# Patient Record
Sex: FEMALE | Race: WHITE | NOT HISPANIC OR LATINO | Employment: OTHER | URBAN - NONMETROPOLITAN AREA
[De-identification: names, ages, dates, MRNs, and addresses within clinical notes are randomized per-mention and may not be internally consistent; named-entity substitution may affect disease eponyms.]

---

## 2023-04-17 NOTE — PROGRESS NOTES
"Subjective     Ciera Deluna is a 73 y.o. female who presents for Follow-up (Follow up meds/results).  Patient is present today with her .      HPI  The patient is a 73 year-old female presenting to the clinic for follow up complaining of cyst on the left wrist causing pressure and pain.  On examination consistent with a ganglion cyst.  Recommend and referred to the orthopedic surgeon/hand surgeon.  Educated osteoporosis and muscle center exercise.  Complaining of congestion and sneezing and clearing throat.  Sinus pressure facial pain headache and low-grade fever.  Home COVID test was negative      Review of Systems  Review of systems    General.  Denies fever.  Denies chills.    HEENT.  Dictated as above  Respiratory.  Denies cough.  Denies shortness of breath.    Cardiovascular.  Denies chest pain.  Denies heart palpitations.  Denies shortness of breath.    Gastrointestinal.  Denies nausea vomiting diarrhea.  Denies abdominal pain.    Genitourinary denies burning urination.  Denies frequent urination.  Denies flank pain.  Denies blood in the urine.  Denies abnormal vaginal discharge.    Neurology.  Denies tingling numbness but denies weakness.  Denies headache.  Denies blurred vision.    Musculoskeletal.  Dictated as above.      Endocrinology.  Denies cold intolerance.  Denies hot intolerance.    Psychiatric.  Denies depression.  Denies anxiety.  Denies suicidal.  Denies homicidal.  Skin.  Dictated as above    Objective   BP 96/61 (BP Location: Left arm, Patient Position: Sitting, BP Cuff Size: Adult)   Pulse 62   Ht 1.575 m (5' 2\")   Wt 46.9 kg (103 lb 6.4 oz)   SpO2 96%   BMI 18.91 kg/m²        Physical Exam  General.  Not in distress.  HEENT normocephalic anicteric sclerae.  Nasal mucosa was moist and congested and tenderness noted on maxillary sinus area.    Neck soft supple no thyromegaly.  No carotid bruit.  Lungs are clear.  Heart regular.  Abdomen soft nontender nondistended bowel " sounds are positive.  Extremities no clubbing cyanosis or edema.  Psychiatric.  Has good eye contact.  No crying spells noted.  Speech was normal.  Denies depression.  Denies suicidal.  Denies homicidal.  Skin exam.  Ganglion cyst noted on the left    Assessment/Plan     1.  Ganglion cyst left hand.  Dictated as above  2.  Osteoporosis.  Educated on osteoporosis and muscles and exercise.  We will continue Prolia and calcium and vitamin D  3.  Allergic rhinitis but educated on allergic rhinitis.  Advised to increase oral fluid intake  4.  Acute recurrent maxillary sinusitis.  Educated on sinusitis but advised to increase oral fluid intake.  Advised to call 911 or to go to the emergency room as soon as possible if develops high fever or shortness of breath.            Problem List Items Addressed This Visit          Musculoskeletal    Age-related osteoporosis without current pathological fracture    Relevant Medications    denosumab (Prolia) 60 mg/mL syringe       Other    Allergic rhinitis due to allergen    Relevant Medications    methylPREDNISolone (Medrol Dospak) 4 mg tablets     Other Visit Diagnoses       Ganglion cyst    -  Primary    Relevant Orders    Referral to Orthopaedic Surgery    Acute recurrent maxillary sinusitis        Relevant Medications    azithromycin (Zithromax) 250 mg tablet    methylPREDNISolone (Medrol Dospak) 4 mg tablets            Scribe Attestation  By signing my name below, IMyrna Scribraul   attest that this documentation has been prepared under the direction and in the presence of Jose Angel Pérez MD.

## 2023-04-18 ENCOUNTER — OFFICE VISIT (OUTPATIENT)
Dept: PRIMARY CARE | Facility: CLINIC | Age: 73
End: 2023-04-18
Payer: MEDICARE

## 2023-04-18 VITALS
SYSTOLIC BLOOD PRESSURE: 96 MMHG | WEIGHT: 103.4 LBS | DIASTOLIC BLOOD PRESSURE: 61 MMHG | HEIGHT: 62 IN | HEART RATE: 62 BPM | OXYGEN SATURATION: 96 % | BODY MASS INDEX: 19.03 KG/M2

## 2023-04-18 DIAGNOSIS — J30.89 NON-SEASONAL ALLERGIC RHINITIS DUE TO OTHER ALLERGIC TRIGGER: ICD-10-CM

## 2023-04-18 DIAGNOSIS — J01.01 ACUTE RECURRENT MAXILLARY SINUSITIS: ICD-10-CM

## 2023-04-18 DIAGNOSIS — M81.0 AGE-RELATED OSTEOPOROSIS WITHOUT CURRENT PATHOLOGICAL FRACTURE: ICD-10-CM

## 2023-04-18 DIAGNOSIS — M67.40 GANGLION CYST: Primary | ICD-10-CM

## 2023-04-18 PROBLEM — J30.9 ALLERGIC RHINITIS DUE TO ALLERGEN: Status: ACTIVE | Noted: 2023-04-18

## 2023-04-18 PROBLEM — L98.9 CHANGING SKIN LESION: Status: ACTIVE | Noted: 2023-04-18

## 2023-04-18 PROBLEM — J30.9 AR (ALLERGIC RHINITIS): Status: ACTIVE | Noted: 2023-04-18

## 2023-04-18 PROBLEM — K59.09 CHRONIC CONSTIPATION: Status: ACTIVE | Noted: 2023-04-18

## 2023-04-18 PROBLEM — F41.9 ANXIETY: Status: ACTIVE | Noted: 2023-04-18

## 2023-04-18 PROBLEM — J34.2 DEVIATED NASAL SEPTUM: Status: ACTIVE | Noted: 2023-04-18

## 2023-04-18 PROBLEM — M70.70 BURSITIS OF HIP: Status: ACTIVE | Noted: 2023-04-18

## 2023-04-18 PROBLEM — K21.9 GERD (GASTROESOPHAGEAL REFLUX DISEASE): Status: ACTIVE | Noted: 2023-04-18

## 2023-04-18 PROBLEM — R53.82 CHRONIC FATIGUE: Status: ACTIVE | Noted: 2023-04-18

## 2023-04-18 PROBLEM — R00.2 HEART PALPITATIONS: Status: ACTIVE | Noted: 2023-04-18

## 2023-04-18 PROBLEM — E78.5 DYSLIPIDEMIA: Status: ACTIVE | Noted: 2023-04-18

## 2023-04-18 PROCEDURE — 1159F MED LIST DOCD IN RCRD: CPT | Performed by: FAMILY MEDICINE

## 2023-04-18 PROCEDURE — 1036F TOBACCO NON-USER: CPT | Performed by: FAMILY MEDICINE

## 2023-04-18 PROCEDURE — 1160F RVW MEDS BY RX/DR IN RCRD: CPT | Performed by: FAMILY MEDICINE

## 2023-04-18 PROCEDURE — 99213 OFFICE O/P EST LOW 20 MIN: CPT | Performed by: FAMILY MEDICINE

## 2023-04-18 RX ORDER — FAMOTIDINE 20 MG/1
20 TABLET, FILM COATED ORAL 2 TIMES DAILY
COMMUNITY
Start: 2022-04-23

## 2023-04-18 RX ORDER — METHYLPREDNISOLONE 4 MG/1
TABLET ORAL
Qty: 21 TABLET | Refills: 0 | Status: SHIPPED | OUTPATIENT
Start: 2023-04-18 | End: 2023-04-25

## 2023-04-18 RX ORDER — FLUTICASONE PROPIONATE 50 MCG
2 SPRAY, SUSPENSION (ML) NASAL 2 TIMES DAILY
COMMUNITY
Start: 2022-04-15 | End: 2023-12-12 | Stop reason: WASHOUT

## 2023-04-18 RX ORDER — DENOSUMAB 60 MG/ML
60 INJECTION SUBCUTANEOUS ONCE
COMMUNITY
Start: 2021-09-08 | End: 2023-09-11 | Stop reason: ALTCHOICE

## 2023-04-18 RX ORDER — AZITHROMYCIN 250 MG/1
TABLET, FILM COATED ORAL
Qty: 6 TABLET | Refills: 0 | Status: SHIPPED | OUTPATIENT
Start: 2023-04-18 | End: 2023-04-23

## 2023-04-18 ASSESSMENT — PAIN SCALES - GENERAL: PAINLEVEL: 0-NO PAIN

## 2023-04-18 ASSESSMENT — ENCOUNTER SYMPTOMS
DEPRESSION: 0
OCCASIONAL FEELINGS OF UNSTEADINESS: 0
LOSS OF SENSATION IN FEET: 0

## 2023-09-11 PROBLEM — J30.9 AR (ALLERGIC RHINITIS): Status: RESOLVED | Noted: 2023-04-18 | Resolved: 2023-09-11

## 2023-09-11 NOTE — PROGRESS NOTES
Subjective     HPI   Ciera Deluna is a 73 y.o. year old female patient with presenting to clinic with concern for   Chief Complaint   Patient presents with    New Patient Visit     Establish care. Schedule next prolia shot for osteoporosis.     Herpes Zoster     Get reoccurring shingles.        Ciera presents to clinic to establish as a new patient. She was previously seen by Dr. Pérez.     Due for prolia    Anxiety has been more severe despite Remeron. Looking for something else to help her. She doesn't like having to take the xanax, but it's the only thing that works.     Patient Active Problem List   Diagnosis    Age-related osteoporosis without current pathological fracture    Allergic rhinitis due to allergen    Anxiety    Bursitis of hip    Changing skin lesion    Chronic constipation    Chronic fatigue    Deviated nasal septum    Dyslipidemia    GERD (gastroesophageal reflux disease)    Heart palpitations    Squamous cell carcinoma in situ       Past Medical History:   Diagnosis Date    Acute maxillary sinusitis, unspecified 2022    Acute non-recurrent maxillary sinusitis    Otitis media, unspecified, bilateral 2022    Acute bilateral otitis media    Personal history of other diseases of the respiratory system 2022    History of acute bronchitis      Past Surgical History:   Procedure Laterality Date    OTHER SURGICAL HISTORY  2021    Cataract surgery    OTHER SURGICAL HISTORY  2021    Tonsillectomy    OTHER SURGICAL HISTORY  2021    Eye surgery    OTHER SURGICAL HISTORY  2021     section      Family History   Problem Relation Name Age of Onset    Pancreatic cancer Mother      Other (Bladder Cancer) Father        Social History     Tobacco Use    Smoking status: Never    Smokeless tobacco: Never   Substance Use Topics    Alcohol use: Yes     Comment: social        Current Outpatient Medications:     multivitamin with minerals  "(multivitamin-iron-folic acid) tablet, Take 1 tablet by mouth once daily., Disp: , Rfl:     ALPRAZolam (Xanax) 0.25 mg tablet, Take 1 tablet (0.25 mg) by mouth as needed at bedtime for anxiety., Disp: 15 tablet, Rfl: 3    famotidine (Pepcid) 20 mg tablet, Take 1 tablet (20 mg) by mouth 2 times a day., Disp: , Rfl:     fluticasone (Flonase) 50 mcg/actuation nasal spray, Administer 2 sprays into affected nostril(s) twice a day., Disp: , Rfl:     mirtazapine (Remeron) 7.5 mg tablet, Take 1 tablet (7.5 mg) by mouth once daily at bedtime., Disp: , Rfl:     sertraline (Zoloft) 25 mg tablet, Take 1 tablet (25 mg) by mouth once daily., Disp: 90 tablet, Rfl: 0    valACYclovir (Valtrex) 500 mg tablet, Take 1 tablet twice a day for 7 days for outbreak, Disp: 21 tablet, Rfl: 3     Review of Systems  Constitutional: Denies fever  HEENT: Denies ST, earache  CVS: Denies Chest pain  Pulmonary: Denies wheezing, SOB  GI: Denies N/V  : Denies dysuria  Musculoskeletal:  Denies myalgia  Neuro: Denies focal weakness or numbness.  Skin: Denies Rashes.  *Review of Systems is negative unless otherwise mentioned in HPI or ROS above.    Objective   /62   Pulse 61   Temp 37 °C (98.6 °F)   Ht 1.575 m (5' 2\")   Wt 46.5 kg (102 lb 9.6 oz)   SpO2 97%   BMI 18.77 kg/m²  reviewed Body mass index is 18.77 kg/m².     Physical Exam  Constitutional: NAD.  Resting comfortably.  Head: Atraumatic, normocephalic.  ENT: Moist oral mucosa. Nasal mucosa mildly edematous and nonerythematous.   Cardiac: Regular rate & rhythm.   Pulmonary: Lungs clear bilat  GI: Soft, Nontender, nondistended.   Musculoskeletal: No peripheral edema.   Skin: No evidence of trauma. No rashes  Psych: Intact judgement and insight.    .Assessment/Plan   Problem List Items Addressed This Visit       Anxiety - Primary    Relevant Medications    ALPRAZolam (Xanax) 0.25 mg tablet    sertraline (Zoloft) 25 mg tablet     Other Visit Diagnoses       Herpes zoster without " complication        Relevant Medications    valACYclovir (Valtrex) 500 mg tablet    Age related osteoporosis, unspecified pathological fracture presence        Relevant Orders    Basic Metabolic Panel    Calcium    Magnesium    Phosphorus    XR mandible < 4 views          Prolia due 10/18/23

## 2023-09-12 ENCOUNTER — OFFICE VISIT (OUTPATIENT)
Dept: PRIMARY CARE | Facility: CLINIC | Age: 73
End: 2023-09-12
Payer: MEDICARE

## 2023-09-12 VITALS
WEIGHT: 102.6 LBS | OXYGEN SATURATION: 97 % | HEART RATE: 61 BPM | BODY MASS INDEX: 18.88 KG/M2 | SYSTOLIC BLOOD PRESSURE: 104 MMHG | HEIGHT: 62 IN | DIASTOLIC BLOOD PRESSURE: 62 MMHG | TEMPERATURE: 98.6 F

## 2023-09-12 DIAGNOSIS — F41.9 ANXIETY: Primary | ICD-10-CM

## 2023-09-12 DIAGNOSIS — M81.0 AGE RELATED OSTEOPOROSIS, UNSPECIFIED PATHOLOGICAL FRACTURE PRESENCE: ICD-10-CM

## 2023-09-12 DIAGNOSIS — B02.9 HERPES ZOSTER WITHOUT COMPLICATION: ICD-10-CM

## 2023-09-12 PROBLEM — D09.9 SQUAMOUS CELL CARCINOMA IN SITU: Status: ACTIVE | Noted: 2023-09-12

## 2023-09-12 PROCEDURE — 1126F AMNT PAIN NOTED NONE PRSNT: CPT | Performed by: PHYSICIAN ASSISTANT

## 2023-09-12 PROCEDURE — 1159F MED LIST DOCD IN RCRD: CPT | Performed by: PHYSICIAN ASSISTANT

## 2023-09-12 PROCEDURE — 99214 OFFICE O/P EST MOD 30 MIN: CPT | Performed by: PHYSICIAN ASSISTANT

## 2023-09-12 PROCEDURE — 1036F TOBACCO NON-USER: CPT | Performed by: PHYSICIAN ASSISTANT

## 2023-09-12 PROCEDURE — 1160F RVW MEDS BY RX/DR IN RCRD: CPT | Performed by: PHYSICIAN ASSISTANT

## 2023-09-12 RX ORDER — MIRTAZAPINE 7.5 MG/1
7.5 TABLET, FILM COATED ORAL NIGHTLY
COMMUNITY
End: 2023-12-12 | Stop reason: SDUPTHER

## 2023-09-12 RX ORDER — SERTRALINE HYDROCHLORIDE 25 MG/1
25 TABLET, FILM COATED ORAL DAILY
Qty: 90 TABLET | Refills: 0 | Status: SHIPPED | OUTPATIENT
Start: 2023-09-12 | End: 2023-12-12 | Stop reason: SDUPTHER

## 2023-09-12 RX ORDER — ALPRAZOLAM 0.25 MG/1
0.25 TABLET ORAL NIGHTLY PRN
Qty: 15 TABLET | Refills: 3 | Status: SHIPPED | OUTPATIENT
Start: 2023-09-12 | End: 2023-12-12 | Stop reason: SDUPTHER

## 2023-09-12 RX ORDER — ALPRAZOLAM 0.25 MG/1
TABLET ORAL
COMMUNITY
Start: 2021-02-11 | End: 2023-09-12 | Stop reason: SDUPTHER

## 2023-09-12 RX ORDER — HYDRALAZINE HYDROCHLORIDE 10 MG/1
10 TABLET, FILM COATED ORAL 3 TIMES DAILY PRN
COMMUNITY
Start: 2022-03-09 | End: 2023-09-12 | Stop reason: WASHOUT

## 2023-09-12 RX ORDER — CITALOPRAM 10 MG/1
TABLET ORAL
COMMUNITY
Start: 2021-07-14 | End: 2023-09-12 | Stop reason: WASHOUT

## 2023-09-12 RX ORDER — VALACYCLOVIR HYDROCHLORIDE 500 MG/1
TABLET, FILM COATED ORAL
Qty: 21 TABLET | Refills: 3 | Status: SHIPPED | OUTPATIENT
Start: 2023-09-12 | End: 2023-12-12 | Stop reason: WASHOUT

## 2023-09-12 ASSESSMENT — ANXIETY QUESTIONNAIRES
6. BECOMING EASILY ANNOYED OR IRRITABLE: NEARLY EVERY DAY
1. FEELING NERVOUS, ANXIOUS, OR ON EDGE: MORE THAN HALF THE DAYS
GAD7 TOTAL SCORE: 15
IF YOU CHECKED OFF ANY PROBLEMS ON THIS QUESTIONNAIRE, HOW DIFFICULT HAVE THESE PROBLEMS MADE IT FOR YOU TO DO YOUR WORK, TAKE CARE OF THINGS AT HOME, OR GET ALONG WITH OTHER PEOPLE: SOMEWHAT DIFFICULT
4. TROUBLE RELAXING: MORE THAN HALF THE DAYS
2. NOT BEING ABLE TO STOP OR CONTROL WORRYING: MORE THAN HALF THE DAYS
7. FEELING AFRAID AS IF SOMETHING AWFUL MIGHT HAPPEN: MORE THAN HALF THE DAYS
3. WORRYING TOO MUCH ABOUT DIFFERENT THINGS: MORE THAN HALF THE DAYS
5. BEING SO RESTLESS THAT IT IS HARD TO SIT STILL: MORE THAN HALF THE DAYS

## 2023-09-12 ASSESSMENT — PATIENT HEALTH QUESTIONNAIRE - PHQ9
2. FEELING DOWN, DEPRESSED OR HOPELESS: NOT AT ALL
5. POOR APPETITE OR OVEREATING: SEVERAL DAYS
10. IF YOU CHECKED OFF ANY PROBLEMS, HOW DIFFICULT HAVE THESE PROBLEMS MADE IT FOR YOU TO DO YOUR WORK, TAKE CARE OF THINGS AT HOME, OR GET ALONG WITH OTHER PEOPLE: SOMEWHAT DIFFICULT
3. TROUBLE FALLING OR STAYING ASLEEP OR SLEEPING TOO MUCH: MORE THAN HALF THE DAYS
7. TROUBLE CONCENTRATING ON THINGS, SUCH AS READING THE NEWSPAPER OR WATCHING TELEVISION: SEVERAL DAYS
4. FEELING TIRED OR HAVING LITTLE ENERGY: NOT AT ALL
6. FEELING BAD ABOUT YOURSELF - OR THAT YOU ARE A FAILURE OR HAVE LET YOURSELF OR YOUR FAMILY DOWN: NOT AT ALL
9. THOUGHTS THAT YOU WOULD BE BETTER OFF DEAD, OR OF HURTING YOURSELF: NOT AT ALL
8. MOVING OR SPEAKING SO SLOWLY THAT OTHER PEOPLE COULD HAVE NOTICED. OR THE OPPOSITE, BEING SO FIGETY OR RESTLESS THAT YOU HAVE BEEN MOVING AROUND A LOT MORE THAN USUAL: SEVERAL DAYS

## 2023-10-04 ENCOUNTER — TELEPHONE (OUTPATIENT)
Dept: PRIMARY CARE | Facility: CLINIC | Age: 73
End: 2023-10-04
Payer: MEDICARE

## 2023-10-04 NOTE — TELEPHONE ENCOUNTER
Patient called saying that since on the Zoloft, she feels great but now she is having trouble sleeping.     She said she was given Zoloft because she gets reoccurring shingles and it can become really itchy. She was told to take that when that happens cause it can make her really anxious. What are your recommendations for helping her sleep?

## 2023-10-11 ENCOUNTER — APPOINTMENT (OUTPATIENT)
Dept: PRIMARY CARE | Facility: CLINIC | Age: 73
End: 2023-10-11
Payer: MEDICARE

## 2023-10-30 DIAGNOSIS — M81.0 AGE RELATED OSTEOPOROSIS, UNSPECIFIED PATHOLOGICAL FRACTURE PRESENCE: Primary | ICD-10-CM

## 2023-10-31 ENCOUNTER — PHARMACY VISIT (OUTPATIENT)
Dept: PHARMACY | Facility: CLINIC | Age: 73
End: 2023-10-31
Payer: COMMERCIAL

## 2023-10-31 DIAGNOSIS — M81.0 AGE-RELATED OSTEOPOROSIS WITHOUT CURRENT PATHOLOGICAL FRACTURE: Primary | ICD-10-CM

## 2023-10-31 DIAGNOSIS — M81.0 AGE RELATED OSTEOPOROSIS, UNSPECIFIED PATHOLOGICAL FRACTURE PRESENCE: ICD-10-CM

## 2023-11-02 DIAGNOSIS — M81.0 AGE-RELATED OSTEOPOROSIS WITHOUT CURRENT PATHOLOGICAL FRACTURE: Primary | ICD-10-CM

## 2023-11-02 RX ORDER — FAMOTIDINE 10 MG/ML
20 INJECTION INTRAVENOUS ONCE AS NEEDED
Status: CANCELLED | OUTPATIENT
Start: 2023-11-07

## 2023-11-02 RX ORDER — EPINEPHRINE 0.3 MG/.3ML
0.3 INJECTION SUBCUTANEOUS EVERY 5 MIN PRN
Status: CANCELLED | OUTPATIENT
Start: 2023-11-07

## 2023-11-02 RX ORDER — DIPHENHYDRAMINE HYDROCHLORIDE 50 MG/ML
50 INJECTION INTRAMUSCULAR; INTRAVENOUS AS NEEDED
Status: CANCELLED | OUTPATIENT
Start: 2023-11-07

## 2023-11-02 RX ORDER — ALBUTEROL SULFATE 0.83 MG/ML
3 SOLUTION RESPIRATORY (INHALATION) AS NEEDED
Status: CANCELLED | OUTPATIENT
Start: 2023-11-07

## 2023-11-03 ENCOUNTER — LAB (OUTPATIENT)
Dept: LAB | Facility: LAB | Age: 73
End: 2023-11-03
Payer: MEDICARE

## 2023-11-03 DIAGNOSIS — M81.0 AGE RELATED OSTEOPOROSIS, UNSPECIFIED PATHOLOGICAL FRACTURE PRESENCE: ICD-10-CM

## 2023-11-03 PROBLEM — M85.80 OTHER SPECIFIED DISORDERS OF BONE DENSITY AND STRUCTURE, UNSPECIFIED SITE: Status: ACTIVE | Noted: 2022-09-15

## 2023-11-03 PROBLEM — L98.9 DISEASE OF SKIN AND SUBCUTANEOUS TISSUE: Status: ACTIVE | Noted: 2022-06-15

## 2023-11-03 PROBLEM — K12.0 ORAL APHTHAE: Status: ACTIVE | Noted: 2023-11-03

## 2023-11-03 PROBLEM — B02.9 HERPES ZOSTER WITHOUT COMPLICATION: Status: ACTIVE | Noted: 2023-11-03

## 2023-11-03 PROBLEM — M19.032 PRIMARY ARTHROSIS OF LEFT DISTAL RADIOULNAR JOINT: Status: ACTIVE | Noted: 2023-11-03

## 2023-11-03 PROBLEM — R63.6 UNDERWEIGHT: Status: ACTIVE | Noted: 2023-11-03

## 2023-11-03 PROBLEM — K13.70 ORAL MUCOSAL LESION: Status: ACTIVE | Noted: 2023-11-03

## 2023-11-03 PROBLEM — F51.04 PSYCHOPHYSIOLOGICAL INSOMNIA: Status: ACTIVE | Noted: 2023-11-03

## 2023-11-03 PROBLEM — M85.89 OSTEOPENIA OF MULTIPLE SITES: Status: ACTIVE | Noted: 2023-11-03

## 2023-11-03 PROBLEM — J34.3 HYPERTROPHY OF NASAL TURBINATES: Status: ACTIVE | Noted: 2022-09-08

## 2023-11-03 PROBLEM — D72.819 LEUCOPENIA: Status: ACTIVE | Noted: 2023-11-03

## 2023-11-03 PROBLEM — R82.90 ABNORMAL RESULT ON SCREENING URINE TEST: Status: ACTIVE | Noted: 2023-11-03

## 2023-11-03 PROBLEM — M19.039 LOCALIZED PRIMARY OSTEOARTHRITIS OF WRIST: Status: ACTIVE | Noted: 2023-05-08

## 2023-11-03 PROBLEM — D04.39 CARCINOMA IN SITU OF SKIN OF OTHER PARTS OF FACE: Status: ACTIVE | Noted: 2022-08-23

## 2023-11-03 PROBLEM — B37.0 CANDIDIASIS OF MOUTH: Status: ACTIVE | Noted: 2022-11-18

## 2023-11-03 PROBLEM — R13.10 DYSPHAGIA: Status: ACTIVE | Noted: 2022-11-18

## 2023-11-03 PROBLEM — E55.9 VITAMIN D DEFICIENCY: Status: ACTIVE | Noted: 2023-11-03

## 2023-11-03 PROBLEM — R00.0 TACHYCARDIA: Status: ACTIVE | Noted: 2022-09-15

## 2023-11-03 PROBLEM — R22.32 LOCALIZED SWELLING, MASS AND LUMP, LEFT UPPER LIMB: Status: ACTIVE | Noted: 2023-05-08

## 2023-11-03 LAB
ANION GAP SERPL CALC-SCNC: 9 MMOL/L (ref 10–20)
BUN SERPL-MCNC: 17 MG/DL (ref 6–23)
CALCIUM SERPL-MCNC: 9.4 MG/DL (ref 8.6–10.3)
CHLORIDE SERPL-SCNC: 102 MMOL/L (ref 98–107)
CO2 SERPL-SCNC: 30 MMOL/L (ref 21–32)
CREAT SERPL-MCNC: 0.75 MG/DL (ref 0.5–1.05)
GFR SERPL CREATININE-BSD FRML MDRD: 84 ML/MIN/1.73M*2
GLUCOSE SERPL-MCNC: 97 MG/DL (ref 74–99)
MAGNESIUM SERPL-MCNC: 2.02 MG/DL (ref 1.6–2.4)
PHOSPHATE SERPL-MCNC: 3.6 MG/DL (ref 2.5–4.9)
POTASSIUM SERPL-SCNC: 4.1 MMOL/L (ref 3.5–5.3)
SODIUM SERPL-SCNC: 137 MMOL/L (ref 136–145)

## 2023-11-03 PROCEDURE — 36415 COLL VENOUS BLD VENIPUNCTURE: CPT

## 2023-11-03 RX ORDER — LIDOCAINE 50 MG/G
1 PATCH TOPICAL
COMMUNITY
Start: 2021-07-14 | End: 2023-12-12 | Stop reason: WASHOUT

## 2023-11-03 RX ORDER — OXYMETAZOLINE HCL 0.05 %
2 SPRAY, NON-AEROSOL (ML) NASAL 2 TIMES DAILY
COMMUNITY
Start: 2019-10-03 | End: 2023-12-12 | Stop reason: WASHOUT

## 2023-11-03 RX ORDER — NAPROXEN SODIUM 220 MG
220 TABLET ORAL 3 TIMES DAILY PRN
COMMUNITY
Start: 2021-02-11

## 2023-11-03 RX ORDER — CARTILAGE/COLLAGEN/BOR/HYALUR 40-10-5 MG
1 TABLET ORAL
COMMUNITY

## 2023-11-03 RX ORDER — CYANOCOBALAMIN 1000 UG/ML
INJECTION, SOLUTION INTRAMUSCULAR; SUBCUTANEOUS
COMMUNITY
End: 2023-12-12 | Stop reason: WASHOUT

## 2023-11-03 RX ORDER — NYSTATIN 100000 [USP'U]/ML
4 SUSPENSION ORAL 4 TIMES DAILY
COMMUNITY
Start: 2022-03-09 | End: 2023-12-12 | Stop reason: WASHOUT

## 2023-11-03 RX ORDER — BETAMETHASONE DIPROPIONATE 0.5 MG/G
GEL TOPICAL 2 TIMES DAILY
COMMUNITY
Start: 2022-12-21 | End: 2023-12-12 | Stop reason: WASHOUT

## 2023-11-03 RX ORDER — ASCORBIC ACID 125 MG
1 TABLET,CHEWABLE ORAL DAILY
COMMUNITY
Start: 2021-02-11

## 2023-11-03 RX ORDER — CHLORHEXIDINE GLUCONATE ORAL RINSE 1.2 MG/ML
SOLUTION DENTAL
COMMUNITY
Start: 2022-11-29 | End: 2023-12-12 | Stop reason: WASHOUT

## 2023-11-03 RX ORDER — ERGOCALCIFEROL 1.25 MG/1
50000 CAPSULE ORAL
COMMUNITY
Start: 2021-02-25 | End: 2023-12-12 | Stop reason: WASHOUT

## 2023-11-06 ENCOUNTER — INFUSION (OUTPATIENT)
Dept: HEMATOLOGY/ONCOLOGY | Facility: HOSPITAL | Age: 73
End: 2023-11-06
Payer: MEDICARE

## 2023-11-06 VITALS
SYSTOLIC BLOOD PRESSURE: 112 MMHG | HEIGHT: 62 IN | TEMPERATURE: 98.2 F | BODY MASS INDEX: 18.82 KG/M2 | RESPIRATION RATE: 16 BRPM | OXYGEN SATURATION: 99 % | WEIGHT: 102.29 LBS | HEART RATE: 71 BPM | DIASTOLIC BLOOD PRESSURE: 71 MMHG

## 2023-11-06 DIAGNOSIS — M81.0 AGE-RELATED OSTEOPOROSIS WITHOUT CURRENT PATHOLOGICAL FRACTURE: ICD-10-CM

## 2023-11-06 PROCEDURE — 96372 THER/PROPH/DIAG INJ SC/IM: CPT

## 2023-11-06 PROCEDURE — 2500000004 HC RX 250 GENERAL PHARMACY W/ HCPCS (ALT 636 FOR OP/ED): Mod: JZ | Performed by: PHYSICIAN ASSISTANT

## 2023-11-06 RX ORDER — FAMOTIDINE 10 MG/ML
20 INJECTION INTRAVENOUS ONCE AS NEEDED
Status: CANCELLED | OUTPATIENT
Start: 2024-05-04

## 2023-11-06 RX ORDER — DIPHENHYDRAMINE HYDROCHLORIDE 50 MG/ML
50 INJECTION INTRAMUSCULAR; INTRAVENOUS AS NEEDED
Status: CANCELLED | OUTPATIENT
Start: 2024-05-04

## 2023-11-06 RX ORDER — EPINEPHRINE 0.3 MG/.3ML
0.3 INJECTION SUBCUTANEOUS EVERY 5 MIN PRN
Status: CANCELLED | OUTPATIENT
Start: 2024-05-04

## 2023-11-06 RX ORDER — ALBUTEROL SULFATE 0.83 MG/ML
3 SOLUTION RESPIRATORY (INHALATION) AS NEEDED
Status: CANCELLED | OUTPATIENT
Start: 2024-05-04

## 2023-11-06 RX ADMIN — DENOSUMAB 60 MG: 60 INJECTION SUBCUTANEOUS at 12:08

## 2023-11-06 ASSESSMENT — PAIN SCALES - GENERAL: PAINLEVEL: 0-NO PAIN

## 2023-12-12 ENCOUNTER — OFFICE VISIT (OUTPATIENT)
Dept: PRIMARY CARE | Facility: CLINIC | Age: 73
End: 2023-12-12
Payer: MEDICARE

## 2023-12-12 VITALS
OXYGEN SATURATION: 99 % | BODY MASS INDEX: 18.33 KG/M2 | TEMPERATURE: 98.6 F | WEIGHT: 99.6 LBS | DIASTOLIC BLOOD PRESSURE: 50 MMHG | SYSTOLIC BLOOD PRESSURE: 108 MMHG | HEART RATE: 67 BPM | HEIGHT: 62 IN

## 2023-12-12 DIAGNOSIS — F41.9 ANXIETY: ICD-10-CM

## 2023-12-12 DIAGNOSIS — Z00.00 MEDICARE ANNUAL WELLNESS VISIT, INITIAL: Primary | ICD-10-CM

## 2023-12-12 DIAGNOSIS — Z00.00 ROUTINE GENERAL MEDICAL EXAMINATION AT HEALTH CARE FACILITY: ICD-10-CM

## 2023-12-12 PROCEDURE — 1126F AMNT PAIN NOTED NONE PRSNT: CPT | Performed by: PHYSICIAN ASSISTANT

## 2023-12-12 PROCEDURE — 1170F FXNL STATUS ASSESSED: CPT | Performed by: PHYSICIAN ASSISTANT

## 2023-12-12 PROCEDURE — 1160F RVW MEDS BY RX/DR IN RCRD: CPT | Performed by: PHYSICIAN ASSISTANT

## 2023-12-12 PROCEDURE — 1036F TOBACCO NON-USER: CPT | Performed by: PHYSICIAN ASSISTANT

## 2023-12-12 PROCEDURE — G0438 PPPS, INITIAL VISIT: HCPCS | Performed by: PHYSICIAN ASSISTANT

## 2023-12-12 PROCEDURE — 1159F MED LIST DOCD IN RCRD: CPT | Performed by: PHYSICIAN ASSISTANT

## 2023-12-12 RX ORDER — ALPRAZOLAM 0.25 MG/1
0.25 TABLET ORAL NIGHTLY PRN
Qty: 15 TABLET | Refills: 3 | Status: SHIPPED | OUTPATIENT
Start: 2023-12-12 | End: 2024-03-12 | Stop reason: SDUPTHER

## 2023-12-12 RX ORDER — MIRTAZAPINE 7.5 MG/1
7.5 TABLET, FILM COATED ORAL NIGHTLY
Qty: 90 TABLET | Refills: 1 | Status: SHIPPED | OUTPATIENT
Start: 2023-12-12 | End: 2024-06-09

## 2023-12-12 RX ORDER — SERTRALINE HYDROCHLORIDE 25 MG/1
25 TABLET, FILM COATED ORAL DAILY
Qty: 90 TABLET | Refills: 1 | Status: SHIPPED | OUTPATIENT
Start: 2023-12-12 | End: 2024-06-09

## 2023-12-12 ASSESSMENT — ACTIVITIES OF DAILY LIVING (ADL)
GROCERY_SHOPPING: INDEPENDENT
TAKING_MEDICATION: INDEPENDENT
DOING_HOUSEWORK: INDEPENDENT
DRESSING: INDEPENDENT
BATHING: INDEPENDENT
MANAGING_FINANCES: INDEPENDENT

## 2023-12-12 ASSESSMENT — PATIENT HEALTH QUESTIONNAIRE - PHQ9
SUM OF ALL RESPONSES TO PHQ9 QUESTIONS 1 AND 2: 0
2. FEELING DOWN, DEPRESSED OR HOPELESS: NOT AT ALL
1. LITTLE INTEREST OR PLEASURE IN DOING THINGS: NOT AT ALL

## 2023-12-12 NOTE — PROGRESS NOTES
Subjective   HPI   Ciera Deluna is a 73 y.o. year old female patient with presenting to clinic with concern for Medicare Visit     Chief Complaint   Patient presents with    Medicare Annual Wellness Visit Initial     Discuss Zoloft. She said it is working well. Has not felt this good in years. Has been on it for 3 mths.curious how long she can take it.     Bone Spurs     On left hip.          She is doing so very well on Zoloft and feeling great. Her mood is improved. Anxiety is improved. Sleep is improved. She feels great. Her marriage relationship is feeling better.    Left hip bone spurs- pain, worsened by driving in the car- went to [place] for Thanksgiving. Usually gets better with rest. Uses memory foam mattress topper. Hip pain goes away within a few days. Does not want to have any ortho injections.    Patient Care Team:  Miranda Sterling PA-C as PCP - General (Family Medicine)  Jose Angel Pérez MD as PCP - [employer] PCP   No Patient Care Coordination Note on file.       **Prolia due 10/18/23, dental care, Ca& VitD due in April 2024  Labs- 5/31/23  Mamm- Due 3/13/24  DEXA- done 3/13/23 Osteoporosis Prolia 2028  Colonosc & EGD- 5/27/22 both wnl Dr Hensley    Specialists  Dr. Kyaw WEEMS hip          Problem List Reviewed  Patient Active Problem List   Diagnosis    Age-related osteoporosis without current pathological fracture    Allergic rhinitis due to allergen    Anxiety    Bursitis of hip    Changing skin lesion    Chronic constipation    Chronic fatigue    Deviated nasal septum    Dyslipidemia    GERD (gastroesophageal reflux disease)    Heart palpitations    Squamous cell carcinoma in situ    Carcinoma in situ of skin of other parts of face    Disease of skin and subcutaneous tissue    Dysphagia    Hypertrophy of nasal turbinates    Localized swelling, mass and lump, left upper limb    Other specified disorders of bone density and structure, unspecified site    Abnormal  result on screening urine test    DJD (degenerative joint disease)    Flying phobia    Herpes zoster without complication    Leucopenia    Oral aphthae    Oral mucosal lesion    Candidiasis of mouth    Osteopenia of multiple sites    Osteoporosis    Personal history of colonic polyps    Localized primary osteoarthritis of wrist    Primary arthrosis of left distal radioulnar joint    Psychophysiological insomnia    Tachycardia    Underweight    Vitamin D deficiency       Past Medical History:   Diagnosis Date    Acute maxillary sinusitis, unspecified 2022    Acute non-recurrent maxillary sinusitis    Otitis media, unspecified, bilateral 2022    Acute bilateral otitis media    Personal history of other diseases of the respiratory system 2022    History of acute bronchitis      Past Surgical History:   Procedure Laterality Date    OTHER SURGICAL HISTORY  2021    Cataract surgery    OTHER SURGICAL HISTORY  2021    Tonsillectomy    OTHER SURGICAL HISTORY  2021    Eye surgery    OTHER SURGICAL HISTORY  2021     section      Family History   Problem Relation Name Age of Onset    Pancreatic cancer Mother      Other (Bladder Cancer) Father        Social History     Tobacco Use    Smoking status: Never    Smokeless tobacco: Never   Substance Use Topics    Alcohol use: Yes     Comment: social        alprazolam 0.25 mg oral Nightly PRN    famotidine 20 mg oral 2 times daily    fluticasone propionate 50 mcg/actuation 2 sprays nasal 2 times daily- PRN    mirtazapine 7.5 mg oral Nightly    multivitamin/iron/folic acid  mg-mcg 1 tablet oral Daily    sertraline HCl 25 mg oral Daily      Medications Reviewed    Current Outpatient Medications:     cholecalciferol, vitamin D3, 25 mcg (1,000 unit) tablet,chewable, Chew 1 tablet (25 mcg) once daily., Disp: , Rfl:     cyanocobalamin, vitamin B-12, (Vitamin B-12) 500 mcg lozenge, Take 1 lozenge (500 mcg) by mouth 1 (one) time per  "week., Disp: , Rfl:     famotidine (Pepcid) 20 mg tablet, Take 1 tablet (20 mg) by mouth 2 times a day., Disp: , Rfl:     multivitamin with minerals (multivitamin-iron-folic acid) tablet, Take 1 tablet by mouth once daily., Disp: , Rfl:     naproxen sodium (Aleve) 220 mg tablet, Take 1 tablet (220 mg) by mouth 3 times a day as needed., Disp: , Rfl:     NON FORMULARY, Take by mouth 1 (one) time per week. Calcium acetate-magnesium carbonate 450mg-200mg oral tablet, Disp: , Rfl:     ALPRAZolam (Xanax) 0.25 mg tablet, Take 1 tablet (0.25 mg) by mouth as needed at bedtime for anxiety., Disp: 15 tablet, Rfl: 3    mirtazapine (Remeron) 7.5 mg tablet, Take 1 tablet (7.5 mg) by mouth once daily at bedtime., Disp: 90 tablet, Rfl: 1    sertraline (Zoloft) 25 mg tablet, Take 1 tablet (25 mg) by mouth once daily., Disp: 90 tablet, Rfl: 1     Preventative Health   Health Maintenance Due   Topic Date Due    Hepatitis C Screening  Never done    Pneumococcal Vaccination (2 - PCV) 05/25/2017    Influenza Vaccine (1) 09/01/2023      There are no preventive care reminders to display for this patient.     Immunizations Reviewed  Immunization History   Administered Date(s) Administered    Influenza, High Dose Seasonal, Preservative Free 11/21/2019    Pfizer Purple Cap SARS-CoV-2 03/01/2021, 04/12/2021    Pneumococcal polysaccharide vaccine, 23-valent, age 2 years and older (PNEUMOVAX 23) 05/25/2016    Tdap vaccine, age 7 year and older (BOOSTRIX) 04/09/2014        Review of Systems  Constitutional: Denies fever  HEENT: Denies ST, earache  CVS: Denies Chest pain  Pulmonary: Denies wheezing, SOB  GI: Denies N/V  : Denies dysuria  Musculoskeletal:  Denies myalgia  Neuro: Denies focal weakness or numbness.  Skin: Denies Rashes.  *Review of Systems is negative unless otherwise mentioned in HPI or ROS above.      @OBJECTIVEBEGIN  /50   Pulse 67   Temp 37 °C (98.6 °F)   Ht 1.575 m (5' 2\")   Wt 45.2 kg (99 lb 9.6 oz)   SpO2 99%   " BMI 18.22 kg/m²  reviewed Body mass index is 18.22 kg/m².     Physical Exam  Constitutional: NAD. Afebrile. Resting comfortably.  ENT: Nasal mucosa and oropharynx: moist oral mucosa. Posterior oropharynx nonerythematous. No posterior pharyngeal streaking.  Eyes: PERRLA. EOM intact. No vertical or circular nystagmus.  Lymph: No anterior cervical chain or submandibular lymphadenopathy. No sentinel lymph nodes.  Cardiac: Regular rate & rhythm. No murmur, gallops, or rubs.  Pulmonary: Lungs clear to auscultation bilaterally with good aeration. No wheezes, rhonchi, or rales. Normal WOB.  GI: Soft, Nontender, nondistended. No guarding. Normal BS x4.  : No suprapubic tenderness. No CVA tenderness to percussion.   Musculoskeletal: No peripheral edema.   Skin: No evidence of trauma. No rashes, Neuro: No focal neuro deficits. Normal gait without assistive devices.  Psych: Intact judgement and insight.    Body mass index is 18.22 kg/m².    .Assessment/Plan   Problem List Items Addressed This Visit             ICD-10-CM    Anxiety F41.9    Relevant Medications    sertraline (Zoloft) 25 mg tablet    ALPRAZolam (Xanax) 0.25 mg tablet    mirtazapine (Remeron) 7.5 mg tablet     Other Visit Diagnoses         Codes    Medicare annual wellness visit, initial    -  Primary Z00.00    Routine general medical examination at health care facility     Z00.00

## 2024-01-30 ENCOUNTER — APPOINTMENT (OUTPATIENT)
Dept: ORTHOPEDIC SURGERY | Facility: CLINIC | Age: 74
End: 2024-01-30
Payer: MEDICARE

## 2024-02-20 ENCOUNTER — APPOINTMENT (OUTPATIENT)
Dept: ORTHOPEDIC SURGERY | Facility: CLINIC | Age: 74
End: 2024-02-20
Payer: MEDICARE

## 2024-03-12 ENCOUNTER — OFFICE VISIT (OUTPATIENT)
Dept: PRIMARY CARE | Facility: CLINIC | Age: 74
End: 2024-03-12
Payer: MEDICARE

## 2024-03-12 VITALS
HEIGHT: 62 IN | OXYGEN SATURATION: 98 % | DIASTOLIC BLOOD PRESSURE: 60 MMHG | SYSTOLIC BLOOD PRESSURE: 122 MMHG | TEMPERATURE: 98.6 F | HEART RATE: 68 BPM | BODY MASS INDEX: 18.62 KG/M2 | WEIGHT: 101.2 LBS

## 2024-03-12 DIAGNOSIS — Z12.31 SCREENING MAMMOGRAM FOR BREAST CANCER: ICD-10-CM

## 2024-03-12 DIAGNOSIS — B00.9 CHRONIC MUCOCUTANEOUS INFECTION DUE TO HERPES SIMPLEX VIRUS (HSV): Primary | ICD-10-CM

## 2024-03-12 DIAGNOSIS — E78.5 BORDERLINE HYPERLIPIDEMIA: ICD-10-CM

## 2024-03-12 DIAGNOSIS — E55.9 VITAMIN D INSUFFICIENCY: ICD-10-CM

## 2024-03-12 DIAGNOSIS — Z79.899 CONTROLLED SUBSTANCE AGREEMENT SIGNED: ICD-10-CM

## 2024-03-12 DIAGNOSIS — F41.9 ANXIETY: ICD-10-CM

## 2024-03-12 PROCEDURE — 1036F TOBACCO NON-USER: CPT | Performed by: PHYSICIAN ASSISTANT

## 2024-03-12 PROCEDURE — 80373 DRUG SCREENING TRAMADOL: CPT

## 2024-03-12 PROCEDURE — 80307 DRUG TEST PRSMV CHEM ANLYZR: CPT

## 2024-03-12 PROCEDURE — 1159F MED LIST DOCD IN RCRD: CPT | Performed by: PHYSICIAN ASSISTANT

## 2024-03-12 PROCEDURE — 80368 SEDATIVE HYPNOTICS: CPT

## 2024-03-12 PROCEDURE — 82570 ASSAY OF URINE CREATININE: CPT

## 2024-03-12 PROCEDURE — 1126F AMNT PAIN NOTED NONE PRSNT: CPT | Performed by: PHYSICIAN ASSISTANT

## 2024-03-12 PROCEDURE — 80365 DRUG SCREENING OXYCODONE: CPT

## 2024-03-12 PROCEDURE — 80361 OPIATES 1 OR MORE: CPT

## 2024-03-12 PROCEDURE — 80358 DRUG SCREENING METHADONE: CPT

## 2024-03-12 PROCEDURE — 80346 BENZODIAZEPINES1-12: CPT

## 2024-03-12 PROCEDURE — 80354 DRUG SCREENING FENTANYL: CPT

## 2024-03-12 PROCEDURE — 99214 OFFICE O/P EST MOD 30 MIN: CPT | Performed by: PHYSICIAN ASSISTANT

## 2024-03-12 RX ORDER — VALACYCLOVIR HYDROCHLORIDE 500 MG/1
500 TABLET, FILM COATED ORAL AS NEEDED
COMMUNITY
End: 2024-03-12 | Stop reason: SDUPTHER

## 2024-03-12 RX ORDER — VALACYCLOVIR HYDROCHLORIDE 500 MG/1
500 TABLET, FILM COATED ORAL 2 TIMES DAILY
Qty: 180 TABLET | Refills: 1 | Status: SHIPPED | OUTPATIENT
Start: 2024-03-12 | End: 2024-09-08

## 2024-03-12 RX ORDER — ALPRAZOLAM 0.25 MG/1
0.25 TABLET ORAL NIGHTLY PRN
Qty: 15 TABLET | Refills: 3 | Status: SHIPPED | OUTPATIENT
Start: 2024-03-12 | End: 2024-06-10

## 2024-03-12 ASSESSMENT — PATIENT HEALTH QUESTIONNAIRE - PHQ9
1. LITTLE INTEREST OR PLEASURE IN DOING THINGS: NOT AT ALL
SUM OF ALL RESPONSES TO PHQ9 QUESTIONS 1 AND 2: 0
2. FEELING DOWN, DEPRESSED OR HOPELESS: NOT AT ALL

## 2024-03-12 NOTE — PROGRESS NOTES
"Subjective     HPI   Ciera Deluna is a 74 y.o. year old female patient with presenting to clinic with concern for   Chief Complaint   Patient presents with    Follow-up     3 mth. Doing well. Zoloft is working well with the anxiety of shingles.        Sleeping very well, Zoloft is helping. \"Haven't felt this great in a long time\"    Recurrent shingles/herpetic outbreaks are appropriately suppressed with valtrex.  Still had outbreaks of herpes on shoulder skin and had 2 cold sores outbreaks of lip while using it as treatment bid for outbreaks.       Xanax helping for break through anxiety, zoloft is doing very well with prevention.  OARRS:  Miranda Sterling PA-C on 3/12/2024 11:55 AM  I have personally reviewed the OARRS report for Ciera Deluna. I have considered the risks of abuse, dependence, addiction and diversion    Is the patient prescribed a combination of a benzodiazepine and opioid?  No    Last Urine Drug Screen / ordered today: Yes  No results found for this or any previous visit (from the past 8760 hour(s)).  N/A    Drug screen ordered      Controlled Substance Agreement:  Date of the Last Agreement: 9/12/23  Reviewed Controlled Substance Agreement including but not limited to the benefits, risks, and alternatives to treatment with a Controlled Substance medication(s).    Benzodiazepines:  What is the patient's goal of therapy? Control of anxiety attacks and improvement of quality of life.  Is this being achieved with current treatment? yes    NEPTALI-7:  No data recorded    Activities of Daily Living:   Is your overall impression that this patient is benefiting (symptom reduction outweighs side effects) from benzodiazepine therapy? Yes     1. Physical Functioning: Better  2. Family Relationship: Better  3. Social Relationship: Better  4. Mood: Better  5. Sleep Patterns: Better  6. Overall Function: Better        Previous visit:  She is doing so very well on Zoloft and feeling great. Her " mood is improved. Anxiety is improved. Sleep is improved. She feels great. Her marriage relationship is feeling better.    Left hip bone spurs- pain, worsened by driving in the car- went to [place] for Thanksgiving. Usually gets better with rest. Uses memory foam mattress topper. Hip pain goes away within a few days. Does not want to have any ortho injections.       Labs- 5/31/22  DUE  Mamm- 3/13/23  DEXA- done 3/13/23 Osteoporosis Prolia 2028  Colonosc & EGD- 5/27/22 both wnl Dr Hensley  LDCT- never smoker      Patient Active Problem List   Diagnosis    Age-related osteoporosis without current pathological fracture    Allergic rhinitis due to allergen    Anxiety    Bursitis of hip    Changing skin lesion    Chronic constipation    Chronic fatigue    Deviated nasal septum    Dyslipidemia    GERD (gastroesophageal reflux disease)    Heart palpitations    Squamous cell carcinoma in situ    Carcinoma in situ of skin of other parts of face    Disease of skin and subcutaneous tissue    Dysphagia    Hypertrophy of nasal turbinates    Localized swelling, mass and lump, left upper limb    Other specified disorders of bone density and structure, unspecified site    Abnormal result on screening urine test    DJD (degenerative joint disease)    Flying phobia    Herpes zoster without complication    Leucopenia    Oral aphthae    Oral mucosal lesion    Candidiasis of mouth    Osteopenia of multiple sites    Osteoporosis    Personal history of colonic polyps    Localized primary osteoarthritis of wrist    Primary arthrosis of left distal radioulnar joint    Psychophysiological insomnia    Tachycardia    Underweight    Vitamin D deficiency       Past Medical History:   Diagnosis Date    Acute maxillary sinusitis, unspecified 05/14/2022    Acute non-recurrent maxillary sinusitis    Otitis media, unspecified, bilateral 05/14/2022    Acute bilateral otitis media    Personal history of other diseases of the respiratory system  2022    History of acute bronchitis      Past Surgical History:   Procedure Laterality Date    OTHER SURGICAL HISTORY  2021    Cataract surgery    OTHER SURGICAL HISTORY  2021    Tonsillectomy    OTHER SURGICAL HISTORY  2021    Eye surgery    OTHER SURGICAL HISTORY  2021     section      Family History   Problem Relation Name Age of Onset    Pancreatic cancer Mother      Other (Bladder Cancer) Father        Social History     Tobacco Use    Smoking status: Never    Smokeless tobacco: Never   Substance Use Topics    Alcohol use: Yes     Comment: social        Current Outpatient Medications:     cholecalciferol, vitamin D3, 25 mcg (1,000 unit) tablet,chewable, Chew 1 tablet (25 mcg) once daily., Disp: , Rfl:     cyanocobalamin, vitamin B-12, (Vitamin B-12) 500 mcg lozenge, Take 1 lozenge (500 mcg) by mouth 1 (one) time per week., Disp: , Rfl:     famotidine (Pepcid) 20 mg tablet, Take 1 tablet (20 mg) by mouth 2 times a day., Disp: , Rfl:     mirtazapine (Remeron) 7.5 mg tablet, Take 1 tablet (7.5 mg) by mouth once daily at bedtime., Disp: 90 tablet, Rfl: 1    multivitamin with minerals (multivitamin-iron-folic acid) tablet, Take 1 tablet by mouth once daily., Disp: , Rfl:     naproxen sodium (Aleve) 220 mg tablet, Take 1 tablet (220 mg) by mouth 3 times a day as needed., Disp: , Rfl:     NON FORMULARY, Take by mouth 1 (one) time per week. Calcium acetate-magnesium carbonate 450mg-200mg oral tablet, Disp: , Rfl:     sertraline (Zoloft) 25 mg tablet, Take 1 tablet (25 mg) by mouth once daily., Disp: 90 tablet, Rfl: 1    ALPRAZolam (Xanax) 0.25 mg tablet, Take 1 tablet (0.25 mg) by mouth as needed at bedtime for anxiety., Disp: 15 tablet, Rfl: 3    valACYclovir (Valtrex) 500 mg tablet, Take 1 tablet (500 mg) by mouth 2 times a day., Disp: 180 tablet, Rfl: 1     Review of Systems  Constitutional: Denies fever  HEENT: Denies ST, earache  CVS: Denies Chest pain  Pulmonary: Denies  "wheezing, SOB  GI: Denies N/V  : Denies dysuria  Musculoskeletal:  Denies myalgia  Neuro: Denies focal weakness or numbness.  Skin: Denies Rashes.  *Review of Systems is negative unless otherwise mentioned in HPI or ROS above.    Objective   /60   Pulse 68   Temp 37 °C (98.6 °F)   Ht 1.575 m (5' 2\")   Wt 45.9 kg (101 lb 3.2 oz)   SpO2 98%   BMI 18.51 kg/m²  reviewed Body mass index is 18.51 kg/m².     Physical Exam  Constitutional: NAD.  Resting comfortably.  Head: Atraumatic, normocephalic.  ENT: Moist oral mucosa. Nasal mucosa wnl.   Cardiac: Regular rate & rhythm.   Pulmonary: Lungs clear bilat  GI: Soft, Nontender, nondistended.   Musculoskeletal: No peripheral edema.   Skin: No evidence of trauma. No rashes  Psych: Intact judgement and insight.    .Assessment/Plan   Problem List Items Addressed This Visit             ICD-10-CM    Anxiety F41.9    Relevant Medications    ALPRAZolam (Xanax) 0.25 mg tablet    Herpes zoster without complication - Primary B02.9    Relevant Medications    valACYclovir (Valtrex) 500 mg tablet     Other Visit Diagnoses         Codes    Borderline hyperlipidemia     E78.5    Relevant Orders    CBC    Comprehensive Metabolic Panel    TSH with reflex to Free T4 if abnormal    Lipid Panel    Vitamin D insufficiency     E55.9    Relevant Orders    Vitamin D 25-Hydroxy,Total (for eval of Vitamin D levels)    Screening mammogram for breast cancer     Z12.31    Relevant Orders    BI mammo bilateral screening tomosynthesis    Controlled substance agreement signed     Z79.899    Relevant Orders    Opiate/Opioid/Benzo Prescription Compliance            "

## 2024-03-13 LAB
AMPHETAMINES UR QL SCN: NORMAL
BARBITURATES UR QL SCN: NORMAL
BZE UR QL SCN: NORMAL
CANNABINOIDS UR QL SCN: NORMAL
CREAT UR-MCNC: 64.3 MG/DL (ref 20–320)
PCP UR QL SCN: NORMAL

## 2024-04-12 ENCOUNTER — HOSPITAL ENCOUNTER (OUTPATIENT)
Dept: RADIOLOGY | Facility: HOSPITAL | Age: 74
Discharge: HOME | End: 2024-04-12
Payer: MEDICARE

## 2024-04-12 VITALS — BODY MASS INDEX: 18.4 KG/M2 | WEIGHT: 100 LBS | HEIGHT: 62 IN

## 2024-04-12 DIAGNOSIS — Z12.31 SCREENING MAMMOGRAM FOR BREAST CANCER: ICD-10-CM

## 2024-04-12 PROCEDURE — 77063 BREAST TOMOSYNTHESIS BI: CPT | Performed by: RADIOLOGY

## 2024-04-12 PROCEDURE — 77067 SCR MAMMO BI INCL CAD: CPT | Performed by: RADIOLOGY

## 2024-04-12 PROCEDURE — 77067 SCR MAMMO BI INCL CAD: CPT

## 2024-04-30 ENCOUNTER — PATIENT MESSAGE (OUTPATIENT)
Dept: HEMATOLOGY/ONCOLOGY | Facility: HOSPITAL | Age: 74
End: 2024-04-30
Payer: MEDICARE

## 2024-05-06 ENCOUNTER — LAB (OUTPATIENT)
Dept: LAB | Facility: LAB | Age: 74
End: 2024-05-06
Payer: MEDICARE

## 2024-05-06 DIAGNOSIS — M81.0 AGE-RELATED OSTEOPOROSIS WITHOUT CURRENT PATHOLOGICAL FRACTURE: ICD-10-CM

## 2024-05-06 LAB
ALBUMIN SERPL BCP-MCNC: 4.2 G/DL (ref 3.4–5)
ALP SERPL-CCNC: 44 U/L (ref 33–136)
ALT SERPL W P-5'-P-CCNC: 12 U/L (ref 7–45)
ANION GAP SERPL CALC-SCNC: 11 MMOL/L (ref 10–20)
AST SERPL W P-5'-P-CCNC: 20 U/L (ref 9–39)
BILIRUB SERPL-MCNC: 0.5 MG/DL (ref 0–1.2)
BUN SERPL-MCNC: 11 MG/DL (ref 6–23)
CA-I BLD-SCNC: 1.18 MMOL/L (ref 1.1–1.33)
CALCIUM SERPL-MCNC: 9.4 MG/DL (ref 8.6–10.3)
CHLORIDE SERPL-SCNC: 104 MMOL/L (ref 98–107)
CO2 SERPL-SCNC: 28 MMOL/L (ref 21–32)
CREAT SERPL-MCNC: 0.74 MG/DL (ref 0.5–1.05)
EGFRCR SERPLBLD CKD-EPI 2021: 85 ML/MIN/1.73M*2
GLUCOSE SERPL-MCNC: 96 MG/DL (ref 74–99)
POTASSIUM SERPL-SCNC: 4.1 MMOL/L (ref 3.5–5.3)
PROT SERPL-MCNC: 6.3 G/DL (ref 6.4–8.2)
SODIUM SERPL-SCNC: 139 MMOL/L (ref 136–145)

## 2024-05-06 PROCEDURE — 36415 COLL VENOUS BLD VENIPUNCTURE: CPT

## 2024-05-06 PROCEDURE — 82330 ASSAY OF CALCIUM: CPT

## 2024-05-06 NOTE — PROGRESS NOTES
Patient scheduled for Prolia injection tomorrow at 12 PM. Labs required before appointment. Attempted to reach patient via [employer] and phone. Left detailed message, with call back number, for patient to call back at earliest convenience and sent attached [employer] message.

## 2024-05-07 ENCOUNTER — INFUSION (OUTPATIENT)
Dept: HEMATOLOGY/ONCOLOGY | Facility: HOSPITAL | Age: 74
End: 2024-05-07
Payer: MEDICARE

## 2024-05-07 VITALS
DIASTOLIC BLOOD PRESSURE: 67 MMHG | HEIGHT: 62 IN | SYSTOLIC BLOOD PRESSURE: 118 MMHG | HEART RATE: 67 BPM | RESPIRATION RATE: 16 BRPM | BODY MASS INDEX: 18.42 KG/M2 | TEMPERATURE: 98.4 F | OXYGEN SATURATION: 96 % | WEIGHT: 100.09 LBS

## 2024-05-07 DIAGNOSIS — M81.0 AGE-RELATED OSTEOPOROSIS WITHOUT CURRENT PATHOLOGICAL FRACTURE: ICD-10-CM

## 2024-05-07 PROCEDURE — 96372 THER/PROPH/DIAG INJ SC/IM: CPT

## 2024-05-07 PROCEDURE — 2500000004 HC RX 250 GENERAL PHARMACY W/ HCPCS (ALT 636 FOR OP/ED): Mod: JZ,TB | Performed by: PHYSICIAN ASSISTANT

## 2024-05-07 RX ORDER — DIPHENHYDRAMINE HYDROCHLORIDE 50 MG/ML
50 INJECTION INTRAMUSCULAR; INTRAVENOUS AS NEEDED
OUTPATIENT
Start: 2024-10-31

## 2024-05-07 RX ORDER — FAMOTIDINE 10 MG/ML
20 INJECTION INTRAVENOUS ONCE AS NEEDED
OUTPATIENT
Start: 2024-10-31

## 2024-05-07 RX ORDER — EPINEPHRINE 0.3 MG/.3ML
0.3 INJECTION SUBCUTANEOUS EVERY 5 MIN PRN
OUTPATIENT
Start: 2024-10-31

## 2024-05-07 RX ORDER — ALBUTEROL SULFATE 0.83 MG/ML
3 SOLUTION RESPIRATORY (INHALATION) AS NEEDED
Status: DISCONTINUED | OUTPATIENT
Start: 2024-05-07 | End: 2024-05-07 | Stop reason: HOSPADM

## 2024-05-07 RX ORDER — FAMOTIDINE 10 MG/ML
20 INJECTION INTRAVENOUS ONCE AS NEEDED
Status: DISCONTINUED | OUTPATIENT
Start: 2024-05-07 | End: 2024-05-07 | Stop reason: HOSPADM

## 2024-05-07 RX ORDER — ALBUTEROL SULFATE 0.83 MG/ML
3 SOLUTION RESPIRATORY (INHALATION) AS NEEDED
OUTPATIENT
Start: 2024-10-31

## 2024-05-07 RX ORDER — DIPHENHYDRAMINE HYDROCHLORIDE 50 MG/ML
50 INJECTION INTRAMUSCULAR; INTRAVENOUS AS NEEDED
Status: DISCONTINUED | OUTPATIENT
Start: 2024-05-07 | End: 2024-05-07 | Stop reason: HOSPADM

## 2024-05-07 RX ORDER — EPINEPHRINE 0.3 MG/.3ML
0.3 INJECTION SUBCUTANEOUS EVERY 5 MIN PRN
Status: DISCONTINUED | OUTPATIENT
Start: 2024-05-07 | End: 2024-05-07 | Stop reason: HOSPADM

## 2024-05-07 RX ADMIN — DENOSUMAB 60 MG: 60 INJECTION SUBCUTANEOUS at 12:14

## 2024-05-07 SDOH — ECONOMIC STABILITY: HOUSING INSECURITY
IN THE LAST 12 MONTHS, WAS THERE A TIME WHEN YOU DID NOT HAVE A STEADY PLACE TO SLEEP OR SLEPT IN A SHELTER (INCLUDING NOW)?: NO

## 2024-05-07 SDOH — ECONOMIC STABILITY: INCOME INSECURITY: IN THE LAST 12 MONTHS, WAS THERE A TIME WHEN YOU WERE NOT ABLE TO PAY THE MORTGAGE OR RENT ON TIME?: NO

## 2024-05-07 SDOH — ECONOMIC STABILITY: TRANSPORTATION INSECURITY
IN THE PAST 12 MONTHS, HAS LACK OF TRANSPORTATION KEPT YOU FROM MEETINGS, WORK, OR FROM GETTING THINGS NEEDED FOR DAILY LIVING?: NO

## 2024-05-07 SDOH — ECONOMIC STABILITY: TRANSPORTATION INSECURITY
IN THE PAST 12 MONTHS, HAS THE LACK OF TRANSPORTATION KEPT YOU FROM MEDICAL APPOINTMENTS OR FROM GETTING MEDICATIONS?: NO

## 2024-05-07 ASSESSMENT — PAIN SCALES - GENERAL: PAINLEVEL: 0-NO PAIN

## 2024-05-07 ASSESSMENT — ENCOUNTER SYMPTOMS
LOSS OF SENSATION IN FEET: 0
OCCASIONAL FEELINGS OF UNSTEADINESS: 0
DEPRESSION: 0

## 2024-05-07 ASSESSMENT — PATIENT HEALTH QUESTIONNAIRE - PHQ9
SUM OF ALL RESPONSES TO PHQ9 QUESTIONS 1 & 2: 0
1. LITTLE INTEREST OR PLEASURE IN DOING THINGS: NOT AT ALL
2. FEELING DOWN, DEPRESSED OR HOPELESS: NOT AT ALL

## 2024-05-07 ASSESSMENT — COLUMBIA-SUICIDE SEVERITY RATING SCALE - C-SSRS
1. IN THE PAST MONTH, HAVE YOU WISHED YOU WERE DEAD OR WISHED YOU COULD GO TO SLEEP AND NOT WAKE UP?: NO
2. HAVE YOU ACTUALLY HAD ANY THOUGHTS OF KILLING YOURSELF?: NO
6. HAVE YOU EVER DONE ANYTHING, STARTED TO DO ANYTHING, OR PREPARED TO DO ANYTHING TO END YOUR LIFE?: NO

## 2024-05-07 ASSESSMENT — SOCIAL DETERMINANTS OF HEALTH (SDOH): HOW HARD IS IT FOR YOU TO PAY FOR THE VERY BASICS LIKE FOOD, HOUSING, MEDICAL CARE, AND HEATING?: NOT VERY HARD

## 2024-06-18 DIAGNOSIS — F41.9 ANXIETY: ICD-10-CM

## 2024-06-18 RX ORDER — SERTRALINE HYDROCHLORIDE 25 MG/1
25 TABLET, FILM COATED ORAL DAILY
Qty: 90 TABLET | Refills: 1 | Status: SHIPPED | OUTPATIENT
Start: 2024-06-18 | End: 2024-12-15

## 2024-06-19 DIAGNOSIS — K13.70 ORAL MUCOSAL LESION: Primary | ICD-10-CM

## 2024-06-19 RX ORDER — CHLORHEXIDINE GLUCONATE ORAL RINSE 1.2 MG/ML
SOLUTION DENTAL
Qty: 120 ML | Refills: 0 | Status: SHIPPED | OUTPATIENT
Start: 2024-06-19

## 2024-06-28 NOTE — PROGRESS NOTES
Subjective   HPI   Ciera Deluna is a 74 y.o. year old female patient with presenting to clinic with concern for Medicare Visit     Chief Complaint   Patient presents with    Medicare Annual Wellness Visit Subsequent     Follow up on shingles medication  Needs something for sleeping  Blood work for cholesterol        Should still be on Calcium supplements, agrees to take calcium again- with vitamin D     Advance Care Planning    Advance Care Planning was discussed with patient and family. Advanced directives and POA and the patient's Advance Care Plan can be documented in the EMR when/if they have plans in order and provide paperwork      LABS DUE  Mammogram 4/12/24 wnl  Should still be taking calcium for osteoporosis.    Waiting on dentist to start prolia  Colonosc & EGD- 5/27/22 both wnl Dr Ayden CHINO:  No data recorded  I have personally reviewed the OARRS report for Ciera Deluna. I have considered the risks of abuse, dependence, addiction and diversion    Is the patient prescribed a combination of a benzodiazepine and opioid?  No    Last Urine Drug Screen / ordered today: No  Recent Results (from the past 8760 hour(s))   Confirmation Opiate/Opioid/Benzo Prescription Compliance    Collection Time: 03/12/24 12:17 PM   Result Value Ref Range    Clonazepam <25 <25 ng/mL    7-Aminoclonazepam <25 <25 ng/mL    Alprazolam <25 <25 ng/mL    Alpha-Hydroxyalprazolam <25 <25 ng/mL    Midazolam <25 <25 ng/mL    Alpha-Hydroxymidazolam <25 <25 ng/mL    Chlordiazepoxide <25 <25 ng/mL    Diazepam <25 <25 ng/mL    Nordiazepam <25 <25 ng/mL    Temazepam <25 <25 ng/mL    Oxazepam <25 <25 ng/mL    Lorazepam <25 <25 ng/mL    Methadone <25 <25 ng/mL    EDDP <25 <25 ng/mL    6-Acetylmorphine <25 <25 ng/mL    Codeine <50 <50 ng/mL    Hydrocodone <25 <25 ng/mL    Hydromorphone <25 <25 ng/mL    Morphine  <50 <50 ng/mL    Norhydrocodone <25 <25 ng/mL    Noroxycodone <25 <25 ng/mL    Oxycodone <25 <25 ng/mL     Oxymorphone <25 <25 ng/mL    Fentanyl <2.5 <2.5 ng/mL    Norfentanyl <2.5 <2.5 ng/mL    Tramadol <50 <50 ng/mL    O-Desmethyltramadol <50 <50 ng/mL    Zolpidem <25 <25 ng/mL    Zolpidem Metabolite (ZCA) <25 <25 ng/mL   Screen Opiate/Opioid/Benzo Prescription Compliance    Collection Time: 03/12/24 12:17 PM   Result Value Ref Range    Creatinine, Urine Random 64.3 20.0 - 320.0 mg/dL    Amphetamine Screen, Urine Presumptive Negative Presumptive Negative    Barbiturate Screen, Urine Presumptive Negative Presumptive Negative    Cannabinoid Screen, Urine Presumptive Negative Presumptive Negative    Cocaine Metabolite Screen, Urine Presumptive Negative Presumptive Negative    PCP Screen, Urine Presumptive Negative Presumptive Negative     Results are as expected.         Controlled Substance Agreement:  Date of the Last Agreement: 9/12/23  Reviewed Controlled Substance Agreement including but not limited to the benefits, risks, and alternatives to treatment with a Controlled Substance medication(s).    Benzodiazepines:  What is the patient's goal of therapy? Break through anxiety and panic attacks  Is this being achieved with current treatment? yes    NEPTALI-7:  7    Activities of Daily Living:   Is your overall impression that this patient is benefiting (symptom reduction outweighs side effects) from benzodiazepine therapy? Yes     1. Physical Functioning: Better  2. Family Relationship: Better  3. Social Relationship: Better  4. Mood: Better  5. Sleep Patterns: Same  6. Overall Function: Better    Patient Care Team:  Miranda Sterling PA-C as PCP - General (Family Medicine)     Specialists    Past Medical, Surgical, and Family History reviewed and updated in chart.    Reviewed all medications by prescribing practitioner or clinical pharmacist (such as prescriptions, OTCs, herbal therapies and supplements) and documented in the medical record.     Preventative Health   Health Maintenance Due   Topic Date Due    Hepatitis C  Screening  Never done    Pneumococcal Vaccination (2 of 2 - PCV) 05/25/2017    DTaP/Tdap/Td Vaccines (2 - Td or Tdap) 04/09/2024            Topic Date Due    DTaP/Tdap/Td Vaccines (2 - Td or Tdap) 04/09/2024        Immunizations Reviewed  Immunization History   Administered Date(s) Administered    Influenza, High Dose Seasonal, Preservative Free 11/21/2019    Pfizer Purple Cap SARS-CoV-2 03/01/2021, 04/12/2021    Pneumococcal polysaccharide vaccine, 23-valent, age 2 years and older (PNEUMOVAX 23) 05/25/2016    Tdap vaccine, age 7 year and older (BOOSTRIX, ADACEL) 04/09/2014        Problem List Reviewed  Patient Active Problem List   Diagnosis    Age-related osteoporosis without current pathological fracture    Allergic rhinitis due to allergen    Anxiety    Bursitis of hip    Changing skin lesion    Chronic constipation    Chronic fatigue    Deviated nasal septum    Dyslipidemia    GERD (gastroesophageal reflux disease)    Heart palpitations    Squamous cell carcinoma in situ    Carcinoma in situ of skin of other parts of face    Disease of skin and subcutaneous tissue    Dysphagia    Hypertrophy of nasal turbinates    Localized swelling, mass and lump, left upper limb    Other specified disorders of bone density and structure, unspecified site    Abnormal result on screening urine test    DJD (degenerative joint disease)    Flying phobia    Herpes zoster without complication    Leucopenia    Oral aphthae    Oral mucosal lesion    Candidiasis of mouth    Osteopenia of multiple sites    Osteoporosis    Personal history of colonic polyps    Localized primary osteoarthritis of wrist    Primary arthrosis of left distal radioulnar joint    Psychophysiological insomnia    Tachycardia    Underweight    Vitamin D deficiency       Past Medical History:   Diagnosis Date    Acute maxillary sinusitis, unspecified 05/14/2022    Acute non-recurrent maxillary sinusitis    Otitis media, unspecified, bilateral 05/14/2022    Acute  bilateral otitis media    Personal history of other diseases of the respiratory system 2022    History of acute bronchitis      Past Surgical History:   Procedure Laterality Date    OTHER SURGICAL HISTORY  2021    Cataract surgery    OTHER SURGICAL HISTORY  2021    Tonsillectomy    OTHER SURGICAL HISTORY  2021    Eye surgery    OTHER SURGICAL HISTORY  2021     section      Family History   Problem Relation Name Age of Onset    Pancreatic cancer Mother      Other (Bladder Cancer) Father        Social History     Tobacco Use    Smoking status: Never    Smokeless tobacco: Never   Substance Use Topics    Alcohol use: Yes     Comment: social        Medications Reviewed  Current Outpatient Medications on File Prior to Visit   Medication Sig Dispense Refill    chlorhexidine (Peridex) 0.12 % solution Rinse with 15 mL the night before surgery and the morning of. 120 mL 0    sertraline (Zoloft) 25 mg tablet Take 1 tablet (25 mg) by mouth once daily. 90 tablet 1    valACYclovir (Valtrex) 500 mg tablet Take 1 tablet (500 mg) by mouth 2 times a day. 180 tablet 1    ALPRAZolam (Xanax) 0.25 mg tablet Take 1 tablet (0.25 mg) by mouth as needed at bedtime for anxiety. 15 tablet 3    cyanocobalamin, vitamin B-12, (Vitamin B-12) 500 mcg lozenge Take 1 lozenge (500 mcg) by mouth 1 (one) time per week.      mirtazapine (Remeron) 7.5 mg tablet Take 1 tablet (7.5 mg) by mouth once daily at bedtime. 90 tablet 1    NON FORMULARY Take by mouth 1 (one) time per week. Calcium acetate-magnesium carbonate 450mg-200mg oral tablet      [DISCONTINUED] cholecalciferol, vitamin D3, 25 mcg (1,000 unit) tablet,chewable Chew 1 tablet (25 mcg) once daily.      [DISCONTINUED] famotidine (Pepcid) 20 mg tablet Take 1 tablet (20 mg) by mouth 2 times a day.      [DISCONTINUED] multivitamin with minerals (multivitamin-iron-folic acid) tablet Take 1 tablet by mouth once daily.      [DISCONTINUED] naproxen sodium (Aleve) 220  "mg tablet Take 1 tablet (220 mg) by mouth 3 times a day as needed.       No current facility-administered medications on file prior to visit.        Review of Systems  Constitutional: Denies fever  HEENT: Denies ST, earache  CVS: Denies Chest pain  Pulmonary: Denies wheezing, SOB  GI: Denies N/V  : Denies dysuria  Musculoskeletal:  Denies myalgia  Neuro: Denies focal weakness or numbness.  Skin: Denies Rashes.  *Review of Systems is negative unless otherwise mentioned in HPI or ROS above.      @OBJECTIVEBEGIN  /66   Pulse 58   Temp 36.1 °C (97 °F)   Ht 1.587 m (5' 2.48\")   Wt 45.5 kg (100 lb 6.4 oz)   SpO2 98%   BMI 18.08 kg/m²  reviewed Body mass index is 18.08 kg/m².     Physical Exam  Constitutional: NAD. Afebrile. Resting comfortably.  ENT: Nasal mucosa and oropharynx: moist oral mucosa. Posterior oropharynx nonerythematous. No posterior pharyngeal streaking.  Eyes: PERRLA. EOM intact. No vertical or circular nystagmus.  Lymph: No anterior cervical chain or submandibular lymphadenopathy. No sentinel lymph nodes.  Cardiac: Regular rate & rhythm. No murmur, gallops, or rubs.  Pulmonary: Lungs clear to auscultation bilaterally with good aeration. No wheezes, rhonchi, or rales. Normal WOB.  GI: Soft, Nontender, nondistended. No guarding. Normal BS x4.  : No suprapubic tenderness. No CVA tenderness to percussion.   Musculoskeletal: No peripheral edema.   Skin: No evidence of trauma. No rashes  Neuro: No focal neuro deficits. Normal gait without assistive devices.  Psych: Intact judgement and insight.    MDM        .Assessment/Plan   Problem List Items Addressed This Visit    None      "

## 2024-07-01 ENCOUNTER — APPOINTMENT (OUTPATIENT)
Dept: PRIMARY CARE | Facility: CLINIC | Age: 74
End: 2024-07-01
Payer: MEDICARE

## 2024-07-01 VITALS
HEART RATE: 58 BPM | TEMPERATURE: 97 F | OXYGEN SATURATION: 98 % | DIASTOLIC BLOOD PRESSURE: 66 MMHG | WEIGHT: 100.4 LBS | BODY MASS INDEX: 18.48 KG/M2 | SYSTOLIC BLOOD PRESSURE: 112 MMHG | HEIGHT: 62 IN

## 2024-07-01 DIAGNOSIS — E78.5 BORDERLINE HYPERLIPIDEMIA: ICD-10-CM

## 2024-07-01 DIAGNOSIS — Z00.00 MEDICARE ANNUAL WELLNESS VISIT, SUBSEQUENT: Primary | ICD-10-CM

## 2024-07-01 DIAGNOSIS — E55.9 VITAMIN D INSUFFICIENCY: ICD-10-CM

## 2024-07-01 DIAGNOSIS — F41.9 ANXIETY: ICD-10-CM

## 2024-07-01 DIAGNOSIS — F51.04 PSYCHOPHYSIOLOGICAL INSOMNIA: ICD-10-CM

## 2024-07-01 PROBLEM — B02.9 HERPES ZOSTER WITHOUT COMPLICATION: Status: RESOLVED | Noted: 2023-11-03 | Resolved: 2024-07-01

## 2024-07-01 PROBLEM — L98.9 CHANGING SKIN LESION: Status: RESOLVED | Noted: 2023-04-18 | Resolved: 2024-07-01

## 2024-07-01 PROBLEM — K13.70 ORAL MUCOSAL LESION: Status: RESOLVED | Noted: 2023-11-03 | Resolved: 2024-07-01

## 2024-07-01 PROBLEM — K12.0 ORAL APHTHAE: Status: RESOLVED | Noted: 2023-11-03 | Resolved: 2024-07-01

## 2024-07-01 PROBLEM — R82.90 ABNORMAL RESULT ON SCREENING URINE TEST: Status: RESOLVED | Noted: 2023-11-03 | Resolved: 2024-07-01

## 2024-07-01 PROBLEM — B37.0 CANDIDIASIS OF MOUTH: Status: RESOLVED | Noted: 2022-11-18 | Resolved: 2024-07-01

## 2024-07-01 PROCEDURE — 1159F MED LIST DOCD IN RCRD: CPT | Performed by: PHYSICIAN ASSISTANT

## 2024-07-01 PROCEDURE — G0439 PPPS, SUBSEQ VISIT: HCPCS | Performed by: PHYSICIAN ASSISTANT

## 2024-07-01 PROCEDURE — 1160F RVW MEDS BY RX/DR IN RCRD: CPT | Performed by: PHYSICIAN ASSISTANT

## 2024-07-01 PROCEDURE — 1036F TOBACCO NON-USER: CPT | Performed by: PHYSICIAN ASSISTANT

## 2024-07-01 PROCEDURE — 1124F ACP DISCUSS-NO DSCNMKR DOCD: CPT | Performed by: PHYSICIAN ASSISTANT

## 2024-07-01 PROCEDURE — 1170F FXNL STATUS ASSESSED: CPT | Performed by: PHYSICIAN ASSISTANT

## 2024-07-01 RX ORDER — TRAZODONE HYDROCHLORIDE 50 MG/1
50 TABLET ORAL NIGHTLY PRN
Qty: 90 TABLET | Refills: 0 | Status: SHIPPED | OUTPATIENT
Start: 2024-07-01

## 2024-07-01 RX ORDER — MIRTAZAPINE 7.5 MG/1
7.5 TABLET, FILM COATED ORAL NIGHTLY
Qty: 90 TABLET | Refills: 1 | Status: SHIPPED | OUTPATIENT
Start: 2024-07-01 | End: 2024-07-01 | Stop reason: WASHOUT

## 2024-07-01 RX ORDER — ALPRAZOLAM 0.25 MG/1
0.25 TABLET ORAL NIGHTLY PRN
Qty: 15 TABLET | Refills: 2 | Status: SHIPPED | OUTPATIENT
Start: 2024-07-01 | End: 2024-09-29

## 2024-07-01 ASSESSMENT — PATIENT HEALTH QUESTIONNAIRE - PHQ9
2. FEELING DOWN, DEPRESSED OR HOPELESS: NOT AT ALL
1. LITTLE INTEREST OR PLEASURE IN DOING THINGS: NOT AT ALL
SUM OF ALL RESPONSES TO PHQ9 QUESTIONS 1 AND 2: 0

## 2024-07-01 ASSESSMENT — ACTIVITIES OF DAILY LIVING (ADL)
MANAGING_FINANCES: INDEPENDENT
BATHING: INDEPENDENT
TAKING_MEDICATION: INDEPENDENT
DRESSING: INDEPENDENT
DOING_HOUSEWORK: INDEPENDENT
GROCERY_SHOPPING: INDEPENDENT

## 2024-07-01 ASSESSMENT — ENCOUNTER SYMPTOMS
LOSS OF SENSATION IN FEET: 0
OCCASIONAL FEELINGS OF UNSTEADINESS: 0
DEPRESSION: 0

## 2024-07-09 ENCOUNTER — LAB (OUTPATIENT)
Dept: LAB | Facility: LAB | Age: 74
End: 2024-07-09
Payer: MEDICARE

## 2024-07-09 DIAGNOSIS — E55.9 VITAMIN D INSUFFICIENCY: ICD-10-CM

## 2024-07-09 DIAGNOSIS — M81.0 AGE RELATED OSTEOPOROSIS, UNSPECIFIED PATHOLOGICAL FRACTURE PRESENCE: ICD-10-CM

## 2024-07-09 DIAGNOSIS — E78.5 BORDERLINE HYPERLIPIDEMIA: ICD-10-CM

## 2024-07-09 LAB
25(OH)D3 SERPL-MCNC: 33 NG/ML (ref 30–100)
CHOLEST SERPL-MCNC: 210 MG/DL (ref 0–199)
CHOLESTEROL/HDL RATIO: 1.9
ERYTHROCYTE [DISTWIDTH] IN BLOOD BY AUTOMATED COUNT: 12.5 % (ref 11.5–14.5)
HCT VFR BLD AUTO: 39.3 % (ref 36–46)
HDLC SERPL-MCNC: 110.4 MG/DL
HGB BLD-MCNC: 12.6 G/DL (ref 12–16)
LDLC SERPL CALC-MCNC: 87 MG/DL
MAGNESIUM SERPL-MCNC: 2.06 MG/DL (ref 1.6–2.4)
MCH RBC QN AUTO: 31 PG (ref 26–34)
MCHC RBC AUTO-ENTMCNC: 32.1 G/DL (ref 32–36)
MCV RBC AUTO: 97 FL (ref 80–100)
NON HDL CHOLESTEROL: 100 MG/DL (ref 0–149)
NRBC BLD-RTO: 0 /100 WBCS (ref 0–0)
PHOSPHATE SERPL-MCNC: 3.6 MG/DL (ref 2.5–4.9)
PLATELET # BLD AUTO: 169 X10*3/UL (ref 150–450)
RBC # BLD AUTO: 4.06 X10*6/UL (ref 4–5.2)
TRIGL SERPL-MCNC: 65 MG/DL (ref 0–149)
TSH SERPL-ACNC: 1.85 MIU/L (ref 0.44–3.98)
VLDL: 13 MG/DL (ref 0–40)
WBC # BLD AUTO: 5.3 X10*3/UL (ref 4.4–11.3)

## 2024-07-09 PROCEDURE — 82306 VITAMIN D 25 HYDROXY: CPT

## 2024-07-09 PROCEDURE — 36415 COLL VENOUS BLD VENIPUNCTURE: CPT

## 2024-10-01 NOTE — PROGRESS NOTES
Subjective     HPI   Ciera Deluna is a 74 y.o. year old female patient with presenting to clinic with concern for   Chief Complaint   Patient presents with    3 month medical management    Osteoporosis     Needs order and BW for Prolia Inj.           Labs- 7/9/24  Mammogram 4/12/24 wnl  DEXA- Should still be taking calcium for osteoporosis.    Was Waiting on dentist to start prolia. Has had root canal. Dental issues resolved  Colonosc & EGD- 5/27/22 both wnl Dr Hensley    Anxiety- zoloft is doing well at current dose. Using xanax infrequently. Sleep is improving    Recurrent shingles- suppressive therapy on Valtrex    Insomnia- Trazodone 50mg, started 3 mo ago.    OARRS:  Miranda Sterling PA-C on 10/2/2024 10:50 AM  I have personally reviewed the OARRS report for Ciera Deluna. I have considered the risks of abuse, dependence, addiction and diversion    Is the patient prescribed a combination of a benzodiazepine and opioid?  No    Last Urine Drug Screen / ordered today: No  Recent Results (from the past 8760 hour(s))   Confirmation Opiate/Opioid/Benzo Prescription Compliance    Collection Time: 03/12/24 12:17 PM   Result Value Ref Range    Clonazepam <25 <25 ng/mL    7-Aminoclonazepam <25 <25 ng/mL    Alprazolam <25 <25 ng/mL    Alpha-Hydroxyalprazolam <25 <25 ng/mL    Midazolam <25 <25 ng/mL    Alpha-Hydroxymidazolam <25 <25 ng/mL    Chlordiazepoxide <25 <25 ng/mL    Diazepam <25 <25 ng/mL    Nordiazepam <25 <25 ng/mL    Temazepam <25 <25 ng/mL    Oxazepam <25 <25 ng/mL    Lorazepam <25 <25 ng/mL    Methadone <25 <25 ng/mL    EDDP <25 <25 ng/mL    6-Acetylmorphine <25 <25 ng/mL    Codeine <50 <50 ng/mL    Hydrocodone <25 <25 ng/mL    Hydromorphone <25 <25 ng/mL    Morphine  <50 <50 ng/mL    Norhydrocodone <25 <25 ng/mL    Noroxycodone <25 <25 ng/mL    Oxycodone <25 <25 ng/mL    Oxymorphone <25 <25 ng/mL    Fentanyl <2.5 <2.5 ng/mL    Norfentanyl <2.5 <2.5 ng/mL    Tramadol <50 <50 ng/mL     O-Desmethyltramadol <50 <50 ng/mL    Zolpidem <25 <25 ng/mL    Zolpidem Metabolite (ZCA) <25 <25 ng/mL   Screen Opiate/Opioid/Benzo Prescription Compliance    Collection Time: 24 12:17 PM   Result Value Ref Range    Creatinine, Urine Random 64.3 20.0 - 320.0 mg/dL    Amphetamine Screen, Urine Presumptive Negative Presumptive Negative    Barbiturate Screen, Urine Presumptive Negative Presumptive Negative    Cannabinoid Screen, Urine Presumptive Negative Presumptive Negative    Cocaine Metabolite Screen, Urine Presumptive Negative Presumptive Negative    PCP Screen, Urine Presumptive Negative Presumptive Negative     Results are as expected.         Controlled Substance Agreement:  Date of the Last Agreement: 10/2/24  Reviewed Controlled Substance Agreement including but not limited to the benefits, risks, and alternatives to treatment with a Controlled Substance medication(s).    Benzodiazepines:  What is the patient's goal of therapy? Treatment of Break through anxiety and panic attacks  Is this being achieved with current treatment? yes    NEPTALI-7:  Over the last 2 weeks, how often have you been bothered by any of the following problems?  Feeling nervous, anxious, or on edge: 0  Not being able to stop or control worryin  Worrying too much about different things: 0  Trouble relaxin  Being so restless that it is hard to sit still: 0  Becoming easily annoyed or irritable: 0  Feeling afraid as if something awful might happen: 0  NEPTALI-7 Total Score: 0        Activities of Daily Living:   Is your overall impression that this patient is benefiting (symptom reduction outweighs side effects) from benzodiazepine therapy? Yes     1. Physical Functioning: Same  2. Family Relationship: Better  3. Social Relationship: Better  4. Mood: Better  5. Sleep Patterns: Better  6. Overall Function: Better    Patient Active Problem List   Diagnosis    Age-related osteoporosis without current pathological fracture    Allergic  rhinitis due to allergen    Anxiety    Bursitis of hip    Chronic constipation    Chronic fatigue    Deviated nasal septum    Dyslipidemia    GERD (gastroesophageal reflux disease)    Heart palpitations    Squamous cell carcinoma in situ    Carcinoma in situ of skin of other parts of face    Disease of skin and subcutaneous tissue    Dysphagia    Hypertrophy of nasal turbinates    Localized swelling, mass and lump, left upper limb    Other specified disorders of bone density and structure, unspecified site    DJD (degenerative joint disease)    Flying phobia    Leucopenia    Osteopenia of multiple sites    Osteoporosis    Personal history of colonic polyps    Localized primary osteoarthritis of wrist    Primary arthrosis of left distal radioulnar joint    Psychophysiological insomnia    Tachycardia    Underweight    Vitamin D deficiency    Protein-calorie malnutrition, unspecified severity (Multi)       Past Medical History:   Diagnosis Date    Acute maxillary sinusitis, unspecified 2022    Acute non-recurrent maxillary sinusitis    Otitis media, unspecified, bilateral 2022    Acute bilateral otitis media    Personal history of other diseases of the respiratory system 2022    History of acute bronchitis      Past Surgical History:   Procedure Laterality Date    OTHER SURGICAL HISTORY  2021    Cataract surgery    OTHER SURGICAL HISTORY  2021    Tonsillectomy    OTHER SURGICAL HISTORY  2021    Eye surgery    OTHER SURGICAL HISTORY  2021     section      Family History   Problem Relation Name Age of Onset    Pancreatic cancer Mother      Other (Bladder Cancer) Father        Social History     Tobacco Use    Smoking status: Never    Smokeless tobacco: Never   Substance Use Topics    Alcohol use: Yes     Comment: social        Current Outpatient Medications:     NON FORMULARY, Take by mouth 1 (one) time per week. Calcium acetate-magnesium carbonate 450mg-200mg oral  tablet, Disp: , Rfl:     sertraline (Zoloft) 25 mg tablet, Take 1 tablet (25 mg) by mouth once daily., Disp: 90 tablet, Rfl: 1    traZODone (Desyrel) 50 mg tablet, Take 1 tablet (50 mg) by mouth as needed at bedtime for sleep., Disp: 90 tablet, Rfl: 0    ALPRAZolam (Xanax) 0.25 mg tablet, Take 1 tablet (0.25 mg) by mouth as needed at bedtime for anxiety., Disp: 15 tablet, Rfl: 2     Review of Systems  Constitutional: Denies fever  HEENT: Denies ST, earache  CVS: Denies Chest pain  Pulmonary: Denies wheezing, SOB  GI: Denies N/V  : Denies dysuria  Musculoskeletal:  Denies myalgia  Neuro: Denies focal weakness or numbness.  Skin: Denies Rashes.  *Review of Systems is negative unless otherwise mentioned in HPI or ROS above.    Objective   /68   Pulse 68   Temp 36.9 °C (98.4 °F)   Wt 45.4 kg (100 lb)   SpO2 100%   BMI 18.01 kg/m²  reviewed Body mass index is 18.01 kg/m².     Physical Exam  Constitutional: NAD.  Resting comfortably.  Head: Atraumatic, normocephalic.  ENT: Moist oral mucosa. Nasal mucosa wnl.   Cardiac: Regular rate & rhythm.   Pulmonary: Lungs clear bilat  GI: Soft, Nontender, nondistended.   Musculoskeletal: No peripheral edema.   Skin: No evidence of trauma. No rashes  Psych: Intact judgement and insight.    .Assessment/Plan   Problem List Items Addressed This Visit             ICD-10-CM    Age-related osteoporosis without current pathological fracture M81.0    Anxiety F41.9    Relevant Medications    ALPRAZolam (Xanax) 0.25 mg tablet    Protein-calorie malnutrition, unspecified severity (Multi) - Primary E46     Increase protein intake in diet. Can use Boost or Ensure in addition to dietary changes

## 2024-10-02 ENCOUNTER — APPOINTMENT (OUTPATIENT)
Dept: PRIMARY CARE | Facility: CLINIC | Age: 74
End: 2024-10-02
Payer: MEDICARE

## 2024-10-02 VITALS
DIASTOLIC BLOOD PRESSURE: 68 MMHG | TEMPERATURE: 98.4 F | BODY MASS INDEX: 18.01 KG/M2 | SYSTOLIC BLOOD PRESSURE: 114 MMHG | HEART RATE: 68 BPM | WEIGHT: 100 LBS | OXYGEN SATURATION: 100 %

## 2024-10-02 DIAGNOSIS — M81.0 AGE-RELATED OSTEOPOROSIS WITHOUT CURRENT PATHOLOGICAL FRACTURE: ICD-10-CM

## 2024-10-02 DIAGNOSIS — F41.9 ANXIETY: ICD-10-CM

## 2024-10-02 DIAGNOSIS — E46 PROTEIN-CALORIE MALNUTRITION, UNSPECIFIED SEVERITY (MULTI): Primary | ICD-10-CM

## 2024-10-02 PROCEDURE — 1036F TOBACCO NON-USER: CPT | Performed by: PHYSICIAN ASSISTANT

## 2024-10-02 PROCEDURE — 1159F MED LIST DOCD IN RCRD: CPT | Performed by: PHYSICIAN ASSISTANT

## 2024-10-02 PROCEDURE — 1160F RVW MEDS BY RX/DR IN RCRD: CPT | Performed by: PHYSICIAN ASSISTANT

## 2024-10-02 PROCEDURE — 99213 OFFICE O/P EST LOW 20 MIN: CPT | Performed by: PHYSICIAN ASSISTANT

## 2024-10-02 RX ORDER — EPINEPHRINE 0.3 MG/.3ML
0.3 INJECTION SUBCUTANEOUS EVERY 5 MIN PRN
OUTPATIENT
Start: 2024-11-02

## 2024-10-02 RX ORDER — DIPHENHYDRAMINE HYDROCHLORIDE 50 MG/ML
50 INJECTION INTRAMUSCULAR; INTRAVENOUS AS NEEDED
OUTPATIENT
Start: 2024-11-02

## 2024-10-02 RX ORDER — ALBUTEROL SULFATE 0.83 MG/ML
3 SOLUTION RESPIRATORY (INHALATION) AS NEEDED
OUTPATIENT
Start: 2024-11-02

## 2024-10-02 RX ORDER — ALPRAZOLAM 0.25 MG/1
0.25 TABLET ORAL NIGHTLY PRN
Qty: 15 TABLET | Refills: 2 | Status: SHIPPED | OUTPATIENT
Start: 2024-10-02 | End: 2024-12-31

## 2024-10-02 RX ORDER — FAMOTIDINE 10 MG/ML
20 INJECTION INTRAVENOUS ONCE AS NEEDED
OUTPATIENT
Start: 2024-11-02

## 2024-10-02 ASSESSMENT — ANXIETY QUESTIONNAIRES
2. NOT BEING ABLE TO STOP OR CONTROL WORRYING: NOT AT ALL
5. BEING SO RESTLESS THAT IT IS HARD TO SIT STILL: NOT AT ALL
6. BECOMING EASILY ANNOYED OR IRRITABLE: NOT AT ALL
GAD7 TOTAL SCORE: 0
4. TROUBLE RELAXING: NOT AT ALL
7. FEELING AFRAID AS IF SOMETHING AWFUL MIGHT HAPPEN: NOT AT ALL
IF YOU CHECKED OFF ANY PROBLEMS ON THIS QUESTIONNAIRE, HOW DIFFICULT HAVE THESE PROBLEMS MADE IT FOR YOU TO DO YOUR WORK, TAKE CARE OF THINGS AT HOME, OR GET ALONG WITH OTHER PEOPLE: NOT DIFFICULT AT ALL
3. WORRYING TOO MUCH ABOUT DIFFERENT THINGS: NOT AT ALL
1. FEELING NERVOUS, ANXIOUS, OR ON EDGE: NOT AT ALL

## 2024-10-22 ENCOUNTER — HOSPITAL ENCOUNTER (EMERGENCY)
Facility: HOSPITAL | Age: 74
Discharge: HOME | End: 2024-10-22
Attending: STUDENT IN AN ORGANIZED HEALTH CARE EDUCATION/TRAINING PROGRAM
Payer: MEDICARE

## 2024-10-22 ENCOUNTER — APPOINTMENT (OUTPATIENT)
Dept: CARDIOLOGY | Facility: HOSPITAL | Age: 74
End: 2024-10-22
Payer: MEDICARE

## 2024-10-22 ENCOUNTER — APPOINTMENT (OUTPATIENT)
Dept: RADIOLOGY | Facility: HOSPITAL | Age: 74
End: 2024-10-22
Payer: MEDICARE

## 2024-10-22 VITALS
SYSTOLIC BLOOD PRESSURE: 112 MMHG | OXYGEN SATURATION: 100 % | HEART RATE: 58 BPM | DIASTOLIC BLOOD PRESSURE: 61 MMHG | RESPIRATION RATE: 15 BRPM | TEMPERATURE: 97.8 F

## 2024-10-22 DIAGNOSIS — R10.9 ABDOMINAL PAIN, UNSPECIFIED ABDOMINAL LOCATION: Primary | ICD-10-CM

## 2024-10-22 DIAGNOSIS — K80.50 BILIARY COLIC: ICD-10-CM

## 2024-10-22 DIAGNOSIS — R10.11 RIGHT UPPER QUADRANT ABDOMINAL PAIN: ICD-10-CM

## 2024-10-22 DIAGNOSIS — R19.7 DIARRHEA, UNSPECIFIED TYPE: ICD-10-CM

## 2024-10-22 LAB
ALBUMIN SERPL BCP-MCNC: 4.4 G/DL (ref 3.4–5)
ALP SERPL-CCNC: 45 U/L (ref 33–136)
ALT SERPL W P-5'-P-CCNC: 15 U/L (ref 7–45)
ANION GAP SERPL CALC-SCNC: 15 MMOL/L (ref 10–20)
APPEARANCE UR: CLEAR
AST SERPL W P-5'-P-CCNC: 16 U/L (ref 9–39)
BASOPHILS # BLD AUTO: 0.04 X10*3/UL (ref 0–0.1)
BASOPHILS NFR BLD AUTO: 0.3 %
BILIRUB SERPL-MCNC: 0.6 MG/DL (ref 0–1.2)
BILIRUB UR STRIP.AUTO-MCNC: NEGATIVE MG/DL
BUN SERPL-MCNC: 14 MG/DL (ref 6–23)
CALCIUM SERPL-MCNC: 9.2 MG/DL (ref 8.6–10.3)
CARDIAC TROPONIN I PNL SERPL HS: <3 NG/L (ref 0–13)
CHLORIDE SERPL-SCNC: 105 MMOL/L (ref 98–107)
CO2 SERPL-SCNC: 23 MMOL/L (ref 21–32)
COLOR UR: COLORLESS
CREAT SERPL-MCNC: 0.74 MG/DL (ref 0.5–1.05)
EGFRCR SERPLBLD CKD-EPI 2021: 85 ML/MIN/1.73M*2
EOSINOPHIL # BLD AUTO: 0.01 X10*3/UL (ref 0–0.4)
EOSINOPHIL NFR BLD AUTO: 0.1 %
ERYTHROCYTE [DISTWIDTH] IN BLOOD BY AUTOMATED COUNT: 12.8 % (ref 11.5–14.5)
GLUCOSE SERPL-MCNC: 114 MG/DL (ref 74–99)
GLUCOSE UR STRIP.AUTO-MCNC: NORMAL MG/DL
HCT VFR BLD AUTO: 38.2 % (ref 36–46)
HGB BLD-MCNC: 12.7 G/DL (ref 12–16)
IMM GRANULOCYTES # BLD AUTO: 0.04 X10*3/UL (ref 0–0.5)
IMM GRANULOCYTES NFR BLD AUTO: 0.3 % (ref 0–0.9)
KETONES UR STRIP.AUTO-MCNC: NEGATIVE MG/DL
LACTATE SERPL-SCNC: 1.2 MMOL/L (ref 0.4–2)
LEUKOCYTE ESTERASE UR QL STRIP.AUTO: NEGATIVE
LIPASE SERPL-CCNC: 40 U/L (ref 9–82)
LYMPHOCYTES # BLD AUTO: 1.75 X10*3/UL (ref 0.8–3)
LYMPHOCYTES NFR BLD AUTO: 15 %
MAGNESIUM SERPL-MCNC: 1.83 MG/DL (ref 1.6–2.4)
MCH RBC QN AUTO: 30.5 PG (ref 26–34)
MCHC RBC AUTO-ENTMCNC: 33.2 G/DL (ref 32–36)
MCV RBC AUTO: 92 FL (ref 80–100)
MONOCYTES # BLD AUTO: 0.42 X10*3/UL (ref 0.05–0.8)
MONOCYTES NFR BLD AUTO: 3.6 %
NEUTROPHILS # BLD AUTO: 9.37 X10*3/UL (ref 1.6–5.5)
NEUTROPHILS NFR BLD AUTO: 80.7 %
NITRITE UR QL STRIP.AUTO: NEGATIVE
NRBC BLD-RTO: 0 /100 WBCS (ref 0–0)
PH UR STRIP.AUTO: 7.5 [PH]
PLATELET # BLD AUTO: 192 X10*3/UL (ref 150–450)
POTASSIUM SERPL-SCNC: 3.7 MMOL/L (ref 3.5–5.3)
PROT SERPL-MCNC: 6.8 G/DL (ref 6.4–8.2)
PROT UR STRIP.AUTO-MCNC: NEGATIVE MG/DL
RBC # BLD AUTO: 4.16 X10*6/UL (ref 4–5.2)
RBC # UR STRIP.AUTO: NEGATIVE /UL
SODIUM SERPL-SCNC: 139 MMOL/L (ref 136–145)
SP GR UR STRIP.AUTO: 1.01
UROBILINOGEN UR STRIP.AUTO-MCNC: NORMAL MG/DL
WBC # BLD AUTO: 11.6 X10*3/UL (ref 4.4–11.3)

## 2024-10-22 PROCEDURE — 83735 ASSAY OF MAGNESIUM: CPT | Performed by: STUDENT IN AN ORGANIZED HEALTH CARE EDUCATION/TRAINING PROGRAM

## 2024-10-22 PROCEDURE — 93005 ELECTROCARDIOGRAM TRACING: CPT

## 2024-10-22 PROCEDURE — 36415 COLL VENOUS BLD VENIPUNCTURE: CPT | Performed by: STUDENT IN AN ORGANIZED HEALTH CARE EDUCATION/TRAINING PROGRAM

## 2024-10-22 PROCEDURE — 2550000001 HC RX 255 CONTRASTS: Performed by: STUDENT IN AN ORGANIZED HEALTH CARE EDUCATION/TRAINING PROGRAM

## 2024-10-22 PROCEDURE — 83605 ASSAY OF LACTIC ACID: CPT | Performed by: STUDENT IN AN ORGANIZED HEALTH CARE EDUCATION/TRAINING PROGRAM

## 2024-10-22 PROCEDURE — 2500000001 HC RX 250 WO HCPCS SELF ADMINISTERED DRUGS (ALT 637 FOR MEDICARE OP): Performed by: STUDENT IN AN ORGANIZED HEALTH CARE EDUCATION/TRAINING PROGRAM

## 2024-10-22 PROCEDURE — 2500000004 HC RX 250 GENERAL PHARMACY W/ HCPCS (ALT 636 FOR OP/ED): Performed by: STUDENT IN AN ORGANIZED HEALTH CARE EDUCATION/TRAINING PROGRAM

## 2024-10-22 PROCEDURE — 84295 ASSAY OF SERUM SODIUM: CPT | Performed by: STUDENT IN AN ORGANIZED HEALTH CARE EDUCATION/TRAINING PROGRAM

## 2024-10-22 PROCEDURE — 74177 CT ABD & PELVIS W/CONTRAST: CPT | Mod: FOREIGN READ | Performed by: RADIOLOGY

## 2024-10-22 PROCEDURE — 84484 ASSAY OF TROPONIN QUANT: CPT | Performed by: STUDENT IN AN ORGANIZED HEALTH CARE EDUCATION/TRAINING PROGRAM

## 2024-10-22 PROCEDURE — 96374 THER/PROPH/DIAG INJ IV PUSH: CPT | Mod: 59

## 2024-10-22 PROCEDURE — 74177 CT ABD & PELVIS W/CONTRAST: CPT

## 2024-10-22 PROCEDURE — 81003 URINALYSIS AUTO W/O SCOPE: CPT | Performed by: STUDENT IN AN ORGANIZED HEALTH CARE EDUCATION/TRAINING PROGRAM

## 2024-10-22 PROCEDURE — 85025 COMPLETE CBC W/AUTO DIFF WBC: CPT | Performed by: STUDENT IN AN ORGANIZED HEALTH CARE EDUCATION/TRAINING PROGRAM

## 2024-10-22 PROCEDURE — 99285 EMERGENCY DEPT VISIT HI MDM: CPT | Mod: 25

## 2024-10-22 PROCEDURE — 83690 ASSAY OF LIPASE: CPT | Performed by: STUDENT IN AN ORGANIZED HEALTH CARE EDUCATION/TRAINING PROGRAM

## 2024-10-22 RX ORDER — DICYCLOMINE HYDROCHLORIDE 10 MG/1
20 CAPSULE ORAL ONCE
Status: COMPLETED | OUTPATIENT
Start: 2024-10-22 | End: 2024-10-22

## 2024-10-22 RX ORDER — ONDANSETRON HYDROCHLORIDE 2 MG/ML
4 INJECTION, SOLUTION INTRAVENOUS ONCE
Status: COMPLETED | OUTPATIENT
Start: 2024-10-22 | End: 2024-10-22

## 2024-10-22 ASSESSMENT — PAIN DESCRIPTION - LOCATION: LOCATION: ABDOMEN

## 2024-10-22 ASSESSMENT — COLUMBIA-SUICIDE SEVERITY RATING SCALE - C-SSRS
6. HAVE YOU EVER DONE ANYTHING, STARTED TO DO ANYTHING, OR PREPARED TO DO ANYTHING TO END YOUR LIFE?: NO
2. HAVE YOU ACTUALLY HAD ANY THOUGHTS OF KILLING YOURSELF?: NO
1. IN THE PAST MONTH, HAVE YOU WISHED YOU WERE DEAD OR WISHED YOU COULD GO TO SLEEP AND NOT WAKE UP?: NO

## 2024-10-22 ASSESSMENT — PAIN - FUNCTIONAL ASSESSMENT: PAIN_FUNCTIONAL_ASSESSMENT: 0-10

## 2024-10-22 ASSESSMENT — PAIN SCALES - GENERAL
PAINLEVEL_OUTOF10: 2
PAINLEVEL_OUTOF10: 6

## 2024-10-22 ASSESSMENT — PAIN DESCRIPTION - DIRECTION: RADIATING_TOWARDS: LEFT FLANK PAIN

## 2024-10-22 NOTE — ED TRIAGE NOTES
Patient has been having diarrhea on and off for several weeks. Pt has associated epigastric pain. Pt woke up at 2am with chills, diarrhea, and right sided abdominal pain/spasm. Pt has been nauseated, but no vomitted.

## 2024-10-22 NOTE — PROGRESS NOTES
Emergency Medicine Transition of Care Note.    I received Ciera Deluna in signout from Dr. HANSON.  Please see the previous ED provider note for all HPI, PE and MDM up to the time of signout at 0700. This is in addition to the primary record.    In brief Ciera Deluna is an 74 y.o. female presenting for   Chief Complaint   Patient presents with    Abdominal Pain     At the time of signout we were awaiting: CT    Diagnoses as of 10/22/24 1026   Abdominal pain, unspecified abdominal location   Diarrhea, unspecified type   Right upper quadrant abdominal pain   Biliary colic       Medical Decision Making      Final diagnoses:   [R10.9] Abdominal pain, unspecified abdominal location   [R19.7] Diarrhea, unspecified type   [R10.11] Right upper quadrant abdominal pain   [K80.50] Biliary colic       Very well-appearing 74-year-old female presents to the ER with chief complaint of abdominal pain that she had last night.  Right upper quadrant.  Patient reports she also has diarrhea.  I do believe this probably more biliary colic however we did not find any emergent findings today.  Will refer her to general surgery.  In addition to that patient has no complaints at this time she reports that she is back to baseline and actually feels great according to her.    Procedure  Procedures    Lorenzo Nieves, DO

## 2024-10-22 NOTE — ED PROVIDER NOTES
Chief Complaint: Abdominal pain  HPI: This is a 74-year-old female, presenting to the emergency department for evaluation of abdominal pain, abdominal cramping, diarrhea which began at 2 AM this morning.  Patient states that she also had cramping in her right upper quadrant, and was concerned about her gallbladder.  She states that she has had intermittent symptoms of abdominal cramping, fatigue, generalized weakness for the last 2 weeks, however today the symptoms were significantly worse which is why she presents to the emergency department.      Past Medical History:   Diagnosis Date    Acute maxillary sinusitis, unspecified 2022    Acute non-recurrent maxillary sinusitis    Anxiety     Otitis media, unspecified, bilateral 2022    Acute bilateral otitis media    Personal history of other diseases of the respiratory system 2022    History of acute bronchitis      Past Surgical History:   Procedure Laterality Date    OTHER SURGICAL HISTORY  2021    Cataract surgery    OTHER SURGICAL HISTORY  2021    Tonsillectomy    OTHER SURGICAL HISTORY  2021    Eye surgery    OTHER SURGICAL HISTORY  2021     section       Physical Exam  Constitutional:       Appearance: Normal appearance.   HENT:      Head: Normocephalic and atraumatic.      Mouth/Throat:      Mouth: Mucous membranes are moist.   Eyes:      Conjunctiva/sclera: Conjunctivae normal.   Cardiovascular:      Rate and Rhythm: Normal rate.   Pulmonary:      Effort: Pulmonary effort is normal.   Abdominal:      General: Abdomen is flat.      Palpations: Abdomen is soft.      Tenderness: There is no abdominal tenderness.   Musculoskeletal:         General: Normal range of motion.      Cervical back: Normal range of motion.   Skin:     General: Skin is warm and dry.   Neurological:      General: No focal deficit present.      Mental Status: She is alert.   Psychiatric:         Mood and Affect: Mood normal.            ED  Course/MDM  Diagnoses as of 10/22/24 0651   Abdominal pain, unspecified abdominal location   Diarrhea, unspecified type     EKG interpreted by myself (ED attending physician): Normal sinus rhythm, rate of 67, normal axis, normal intervals, no ST segment changes, nonischemic EKG    This is a 74 y.o. female presenting to the ED for evaluation of abdominal cramping and diarrhea which began at 2 AM this morning.  Patient states that she also had some nausea however denies any vomiting.  On physical exam, the patient is resting comfortably in bed, no acute distress.  Abdomen is soft and nontender.  Heart is regular rate and rhythm, lungs without auscultation bilaterally.  Lab work and imaging were ordered, and are pending at time of signout to the oncoming ED team.  She was signed out in stable condition.      Final Impression  1. Abdominal pain  2.  Diarrhea  Disposition/Plan: Signout  Condition at disposition: Stable.     Janey Lawrence DO  Emergency Medicine Physician     Janey Lawrence,   10/22/24 0651

## 2024-10-23 LAB
ATRIAL RATE: 67 BPM
P AXIS: 80 DEGREES
P OFFSET: 206 MS
P ONSET: 171 MS
PR INTERVAL: 104 MS
Q ONSET: 223 MS
QRS COUNT: 11 BEATS
QRS DURATION: 66 MS
QT INTERVAL: 408 MS
QTC CALCULATION(BAZETT): 431 MS
QTC FREDERICIA: 423 MS
R AXIS: 79 DEGREES
T AXIS: 67 DEGREES
T OFFSET: 427 MS
VENTRICULAR RATE: 67 BPM

## 2024-11-04 ENCOUNTER — APPOINTMENT (OUTPATIENT)
Dept: HEMATOLOGY/ONCOLOGY | Facility: HOSPITAL | Age: 74
End: 2024-11-04
Payer: MEDICARE

## 2024-11-06 ENCOUNTER — LAB (OUTPATIENT)
Dept: LAB | Facility: LAB | Age: 74
End: 2024-11-06
Payer: MEDICARE

## 2024-11-06 DIAGNOSIS — M81.0 AGE-RELATED OSTEOPOROSIS WITHOUT CURRENT PATHOLOGICAL FRACTURE: ICD-10-CM

## 2024-11-06 LAB
ALBUMIN SERPL BCP-MCNC: 4.3 G/DL (ref 3.4–5)
ALP SERPL-CCNC: 45 U/L (ref 33–136)
ALT SERPL W P-5'-P-CCNC: 10 U/L (ref 7–45)
ANION GAP SERPL CALC-SCNC: 7 MMOL/L (ref 10–20)
AST SERPL W P-5'-P-CCNC: 16 U/L (ref 9–39)
BILIRUB SERPL-MCNC: 0.4 MG/DL (ref 0–1.2)
BUN SERPL-MCNC: 11 MG/DL (ref 6–23)
CA-I BLD-SCNC: 1.26 MMOL/L (ref 1.1–1.33)
CALCIUM SERPL-MCNC: 9.5 MG/DL (ref 8.6–10.3)
CHLORIDE SERPL-SCNC: 103 MMOL/L (ref 98–107)
CO2 SERPL-SCNC: 33 MMOL/L (ref 21–32)
CREAT SERPL-MCNC: 0.78 MG/DL (ref 0.5–1.05)
EGFRCR SERPLBLD CKD-EPI 2021: 80 ML/MIN/1.73M*2
GLUCOSE SERPL-MCNC: 91 MG/DL (ref 74–99)
POTASSIUM SERPL-SCNC: 3.8 MMOL/L (ref 3.5–5.3)
PROT SERPL-MCNC: 6.6 G/DL (ref 6.4–8.2)
SODIUM SERPL-SCNC: 139 MMOL/L (ref 136–145)

## 2024-11-06 PROCEDURE — 36415 COLL VENOUS BLD VENIPUNCTURE: CPT

## 2024-11-06 PROCEDURE — 82330 ASSAY OF CALCIUM: CPT

## 2024-11-07 ENCOUNTER — INFUSION (OUTPATIENT)
Dept: HEMATOLOGY/ONCOLOGY | Facility: HOSPITAL | Age: 74
End: 2024-11-07
Payer: MEDICARE

## 2024-11-07 VITALS
SYSTOLIC BLOOD PRESSURE: 104 MMHG | TEMPERATURE: 97 F | DIASTOLIC BLOOD PRESSURE: 54 MMHG | WEIGHT: 99.65 LBS | HEART RATE: 60 BPM | BODY MASS INDEX: 17.95 KG/M2 | RESPIRATION RATE: 16 BRPM | OXYGEN SATURATION: 97 %

## 2024-11-07 DIAGNOSIS — M81.0 AGE-RELATED OSTEOPOROSIS WITHOUT CURRENT PATHOLOGICAL FRACTURE: ICD-10-CM

## 2024-11-07 PROCEDURE — 96372 THER/PROPH/DIAG INJ SC/IM: CPT

## 2024-11-07 PROCEDURE — 2500000004 HC RX 250 GENERAL PHARMACY W/ HCPCS (ALT 636 FOR OP/ED): Mod: JZ,TB | Performed by: PHYSICIAN ASSISTANT

## 2024-11-07 RX ORDER — ALBUTEROL SULFATE 0.83 MG/ML
3 SOLUTION RESPIRATORY (INHALATION) AS NEEDED
OUTPATIENT
Start: 2025-05-02

## 2024-11-07 RX ORDER — DIPHENHYDRAMINE HYDROCHLORIDE 50 MG/ML
50 INJECTION INTRAMUSCULAR; INTRAVENOUS AS NEEDED
OUTPATIENT
Start: 2025-05-02

## 2024-11-07 RX ORDER — FAMOTIDINE 10 MG/ML
20 INJECTION INTRAVENOUS ONCE AS NEEDED
OUTPATIENT
Start: 2025-05-02

## 2024-11-07 RX ORDER — EPINEPHRINE 0.3 MG/.3ML
0.3 INJECTION SUBCUTANEOUS EVERY 5 MIN PRN
OUTPATIENT
Start: 2025-05-02

## 2024-11-07 ASSESSMENT — PATIENT HEALTH QUESTIONNAIRE - PHQ9
2. FEELING DOWN, DEPRESSED OR HOPELESS: NOT AT ALL
1. LITTLE INTEREST OR PLEASURE IN DOING THINGS: NOT AT ALL
SUM OF ALL RESPONSES TO PHQ9 QUESTIONS 1 & 2: 0

## 2024-11-07 ASSESSMENT — PAIN SCALES - GENERAL: PAINLEVEL_OUTOF10: 0-NO PAIN

## 2024-11-13 ENCOUNTER — APPOINTMENT (OUTPATIENT)
Dept: SURGERY | Facility: CLINIC | Age: 74
End: 2024-11-13
Payer: MEDICARE

## 2024-12-18 ENCOUNTER — LAB (OUTPATIENT)
Dept: LAB | Facility: LAB | Age: 74
End: 2024-12-18
Payer: MEDICARE

## 2024-12-18 DIAGNOSIS — E55.9 VITAMIN D INSUFFICIENCY: ICD-10-CM

## 2024-12-18 DIAGNOSIS — E78.5 BORDERLINE HYPERLIPIDEMIA: ICD-10-CM

## 2024-12-18 LAB
25(OH)D3 SERPL-MCNC: 31 NG/ML (ref 30–100)
ALBUMIN SERPL BCP-MCNC: 4.2 G/DL (ref 3.4–5)
ALP SERPL-CCNC: 37 U/L (ref 33–136)
ALT SERPL W P-5'-P-CCNC: 13 U/L (ref 7–45)
ANION GAP SERPL CALC-SCNC: 10 MMOL/L (ref 10–20)
AST SERPL W P-5'-P-CCNC: 17 U/L (ref 9–39)
BILIRUB SERPL-MCNC: 0.6 MG/DL (ref 0–1.2)
BUN SERPL-MCNC: 14 MG/DL (ref 6–23)
CALCIUM SERPL-MCNC: 9.2 MG/DL (ref 8.6–10.3)
CHLORIDE SERPL-SCNC: 104 MMOL/L (ref 98–107)
CHOLEST SERPL-MCNC: 213 MG/DL (ref 0–199)
CHOLESTEROL/HDL RATIO: 2
CO2 SERPL-SCNC: 29 MMOL/L (ref 21–32)
CREAT SERPL-MCNC: 0.79 MG/DL (ref 0.5–1.05)
EGFRCR SERPLBLD CKD-EPI 2021: 79 ML/MIN/1.73M*2
ERYTHROCYTE [DISTWIDTH] IN BLOOD BY AUTOMATED COUNT: 13.1 % (ref 11.5–14.5)
GLUCOSE SERPL-MCNC: 88 MG/DL (ref 74–99)
HCT VFR BLD AUTO: 37.7 % (ref 36–46)
HDLC SERPL-MCNC: 108.7 MG/DL
HGB BLD-MCNC: 12 G/DL (ref 12–16)
LDLC SERPL CALC-MCNC: 93 MG/DL
MCH RBC QN AUTO: 29.9 PG (ref 26–34)
MCHC RBC AUTO-ENTMCNC: 31.8 G/DL (ref 32–36)
MCV RBC AUTO: 94 FL (ref 80–100)
NON HDL CHOLESTEROL: 104 MG/DL (ref 0–149)
NRBC BLD-RTO: 0 /100 WBCS (ref 0–0)
PLATELET # BLD AUTO: 191 X10*3/UL (ref 150–450)
POTASSIUM SERPL-SCNC: 3.6 MMOL/L (ref 3.5–5.3)
PROT SERPL-MCNC: 6.3 G/DL (ref 6.4–8.2)
RBC # BLD AUTO: 4.01 X10*6/UL (ref 4–5.2)
SODIUM SERPL-SCNC: 139 MMOL/L (ref 136–145)
TRIGL SERPL-MCNC: 58 MG/DL (ref 0–149)
TSH SERPL-ACNC: 1.56 MIU/L (ref 0.44–3.98)
VLDL: 12 MG/DL (ref 0–40)
WBC # BLD AUTO: 5 X10*3/UL (ref 4.4–11.3)

## 2024-12-18 PROCEDURE — 82306 VITAMIN D 25 HYDROXY: CPT

## 2025-01-01 NOTE — PROGRESS NOTES
Subjective     HPI   Ciera Deluna is a 74 y.o. year old female patient with presenting to clinic with concern for   Chief Complaint   Patient presents with    Follow-up     3 months       3 month follow up & xanax refill? Check zoloft dose. Insomnia check up.     Abdominal pain in later October . Has not recurred. No definitive cause. No gallstones.    Anxiety  -zoloft 25      Using xanax infrequently  .  Xanax 0.25, not filled since October. 15 tab per 30 days.   Xanax 6 month follow up visit. MCA    Recurrent shingles  - Valtrex  suppressive therapy     Insomnia  - Trazodone 50mg  taking half PRN 25mg    Osteoporosis  -Prolia  -calcium and vitamin D    Protein calorie malnutrition  -Increase protein intake in diet. Can use Boost or Ensure in addition to dietary changes      Xanax  OARRS:  Miranda Sterling PA-C on 1/1/2025  1:26 PM  I have personally reviewed the OARRS report for Ciera Deluna. I have considered the risks of abuse, dependence, addiction and diversion    Is the patient prescribed a combination of a benzodiazepine and opioid?  No    Last Urine Drug Screen / ordered today: No  Recent Results (from the past 8760 hours)   Confirmation Opiate/Opioid/Benzo Prescription Compliance    Collection Time: 03/12/24 12:17 PM   Result Value Ref Range    Clonazepam <25 <25 ng/mL    7-Aminoclonazepam <25 <25 ng/mL    Alprazolam <25 <25 ng/mL    Alpha-Hydroxyalprazolam <25 <25 ng/mL    Midazolam <25 <25 ng/mL    Alpha-Hydroxymidazolam <25 <25 ng/mL    Chlordiazepoxide <25 <25 ng/mL    Diazepam <25 <25 ng/mL    Nordiazepam <25 <25 ng/mL    Temazepam <25 <25 ng/mL    Oxazepam <25 <25 ng/mL    Lorazepam <25 <25 ng/mL    Methadone <25 <25 ng/mL    EDDP <25 <25 ng/mL    6-Acetylmorphine <25 <25 ng/mL    Codeine <50 <50 ng/mL    Hydrocodone <25 <25 ng/mL    Hydromorphone <25 <25 ng/mL    Morphine  <50 <50 ng/mL    Norhydrocodone <25 <25 ng/mL    Noroxycodone <25 <25 ng/mL    Oxycodone <25 <25 ng/mL     Oxymorphone <25 <25 ng/mL    Fentanyl <2.5 <2.5 ng/mL    Norfentanyl <2.5 <2.5 ng/mL    Tramadol <50 <50 ng/mL    O-Desmethyltramadol <50 <50 ng/mL    Zolpidem <25 <25 ng/mL    Zolpidem Metabolite (ZCA) <25 <25 ng/mL   Screen Opiate/Opioid/Benzo Prescription Compliance    Collection Time: 03/12/24 12:17 PM   Result Value Ref Range    Creatinine, Urine Random 64.3 20.0 - 320.0 mg/dL    Amphetamine Screen, Urine Presumptive Negative Presumptive Negative    Barbiturate Screen, Urine Presumptive Negative Presumptive Negative    Cannabinoid Screen, Urine Presumptive Negative Presumptive Negative    Cocaine Metabolite Screen, Urine Presumptive Negative Presumptive Negative    PCP Screen, Urine Presumptive Negative Presumptive Negative     Results are as expected.         Controlled Substance Agreement:  Date of the Last Agreement: 10/2/24  Reviewed Controlled Substance Agreement including but not limited to the benefits, risks, and alternatives to treatment with a Controlled Substance medication(s).    Benzodiazepines:  What is the patient's goal of therapy? Break through anxiety and panic attacks  Is this being achieved with current treatment? Yes, using less likely, sparingly    NEPTALI-7:  2    Activities of Daily Living:   Is your overall impression that this patient is benefiting (symptom reduction outweighs side effects) from benzodiazepine therapy? Yes     1. Physical Functioning: Same  2. Family Relationship: Same  3. Social Relationship: Better  4. Mood: Better  5. Sleep Patterns: Better  6. Overall Function: Better    Patient Active Problem List   Diagnosis    Age-related osteoporosis without current pathological fracture    Allergic rhinitis due to allergen    Anxiety    Bursitis of hip    Chronic constipation    Chronic fatigue    Deviated nasal septum    Dyslipidemia    GERD (gastroesophageal reflux disease)    Heart palpitations    Squamous cell carcinoma in situ    Carcinoma in situ of skin of other parts of  face    Disease of skin and subcutaneous tissue    Dysphagia    Hypertrophy of nasal turbinates    Localized swelling, mass and lump, left upper limb    Other specified disorders of bone density and structure, unspecified site    DJD (degenerative joint disease)    Flying phobia    Leucopenia    Osteopenia of multiple sites    Osteoporosis    Personal history of colonic polyps    Localized primary osteoarthritis of wrist    Primary arthrosis of left distal radioulnar joint    Psychophysiological insomnia    Tachycardia    Underweight    Vitamin D deficiency    Protein-calorie malnutrition, unspecified severity (Multi)       Past Medical History:   Diagnosis Date    Acute maxillary sinusitis, unspecified 2022    Acute non-recurrent maxillary sinusitis    Anxiety     Otitis media, unspecified, bilateral 2022    Acute bilateral otitis media    Personal history of other diseases of the respiratory system 2022    History of acute bronchitis      Past Surgical History:   Procedure Laterality Date    OTHER SURGICAL HISTORY  2021    Cataract surgery    OTHER SURGICAL HISTORY  2021    Tonsillectomy    OTHER SURGICAL HISTORY  2021    Eye surgery    OTHER SURGICAL HISTORY  2021     section      Family History   Problem Relation Name Age of Onset    Pancreatic cancer Mother      Other (Bladder Cancer) Father        Social History     Tobacco Use    Smoking status: Never    Smokeless tobacco: Never   Substance Use Topics    Alcohol use: Yes     Comment: social        Current Outpatient Medications:     ALPRAZolam (Xanax) 0.25 mg tablet, Take 1 tablet (0.25 mg) by mouth as needed at bedtime for anxiety., Disp: 15 tablet, Rfl: 2    NON FORMULARY, Take by mouth 1 (one) time per week. Calcium acetate-magnesium carbonate 450mg-200mg oral tablet, Disp: , Rfl:     sertraline (Zoloft) 25 mg tablet, Take 1 tablet (25 mg) by mouth once daily., Disp: 90 tablet, Rfl: 1    traZODone (Desyrel)  "50 mg tablet, Take 1 tablet (50 mg) by mouth as needed at bedtime for sleep., Disp: 90 tablet, Rfl: 0     Review of Systems  Constitutional: Denies fever  HEENT: Denies ST, earache  CVS: Denies Chest pain  Pulmonary: Denies wheezing, SOB  GI: Denies N/V  : Denies dysuria  Musculoskeletal:  Denies myalgia  Neuro: Denies focal weakness or numbness.  Skin: Denies Rashes.  *Review of Systems is negative unless otherwise mentioned in HPI or ROS above.    Objective   BP 84/52   Pulse 67   Temp 36.3 °C (97.4 °F)   Ht 1.587 m (5' 2.48\")   SpO2 100%   BMI 17.95 kg/m²  reviewed Body mass index is 17.95 kg/m².     Physical Exam  Constitutional: NAD.  Resting comfortably.  Head: Atraumatic, normocephalic.  ENT: Moist oral mucosa. Nasal mucosa wnl.   Cardiac: Regular rate & rhythm.   Pulmonary: Lungs clear bilat  GI: Soft, Nontender, nondistended.   Musculoskeletal: No peripheral edema.   Skin: No evidence of trauma. No rashes  Psych: Intact judgement and insight.    .Assessment/Plan   Problem List Items Addressed This Visit    None      "

## 2025-01-02 ENCOUNTER — APPOINTMENT (OUTPATIENT)
Dept: PRIMARY CARE | Facility: CLINIC | Age: 75
End: 2025-01-02
Payer: MEDICARE

## 2025-01-02 VITALS
HEIGHT: 62 IN | TEMPERATURE: 97.4 F | HEART RATE: 67 BPM | SYSTOLIC BLOOD PRESSURE: 106 MMHG | BODY MASS INDEX: 17.95 KG/M2 | DIASTOLIC BLOOD PRESSURE: 62 MMHG | OXYGEN SATURATION: 100 %

## 2025-01-02 DIAGNOSIS — Z12.31 SCREENING MAMMOGRAM FOR BREAST CANCER: ICD-10-CM

## 2025-01-02 DIAGNOSIS — F41.9 ANXIETY: Primary | ICD-10-CM

## 2025-01-02 PROCEDURE — 1036F TOBACCO NON-USER: CPT | Performed by: PHYSICIAN ASSISTANT

## 2025-01-02 PROCEDURE — G2211 COMPLEX E/M VISIT ADD ON: HCPCS | Performed by: PHYSICIAN ASSISTANT

## 2025-01-02 PROCEDURE — 99213 OFFICE O/P EST LOW 20 MIN: CPT | Performed by: PHYSICIAN ASSISTANT

## 2025-01-02 PROCEDURE — 1160F RVW MEDS BY RX/DR IN RCRD: CPT | Performed by: PHYSICIAN ASSISTANT

## 2025-01-02 PROCEDURE — 1159F MED LIST DOCD IN RCRD: CPT | Performed by: PHYSICIAN ASSISTANT

## 2025-01-02 RX ORDER — ALPRAZOLAM 0.25 MG/1
0.25 TABLET ORAL NIGHTLY PRN
Qty: 15 TABLET | Refills: 2 | Status: SHIPPED | OUTPATIENT
Start: 2025-01-02 | End: 2025-07-01

## 2025-01-09 DIAGNOSIS — F41.9 ANXIETY: ICD-10-CM

## 2025-01-10 RX ORDER — SERTRALINE HYDROCHLORIDE 25 MG/1
25 TABLET, FILM COATED ORAL DAILY
Qty: 90 TABLET | Refills: 1 | Status: SHIPPED | OUTPATIENT
Start: 2025-01-10 | End: 2025-07-09

## 2025-03-13 ENCOUNTER — TELEPHONE (OUTPATIENT)
Dept: PRIMARY CARE | Facility: CLINIC | Age: 75
End: 2025-03-13
Payer: MEDICARE

## 2025-03-13 DIAGNOSIS — B02.9 HERPES ZOSTER WITHOUT COMPLICATION: Primary | ICD-10-CM

## 2025-03-13 RX ORDER — VALACYCLOVIR HYDROCHLORIDE 500 MG/1
500 TABLET, FILM COATED ORAL DAILY
Qty: 90 TABLET | Refills: 3 | Status: SHIPPED | OUTPATIENT
Start: 2025-03-13

## 2025-03-13 RX ORDER — VALACYCLOVIR HYDROCHLORIDE 1 G/1
500 TABLET, FILM COATED ORAL 2 TIMES DAILY
Qty: 7 TABLET | Refills: 0 | Status: CANCELLED | OUTPATIENT
Start: 2025-03-13 | End: 2025-03-20

## 2025-03-13 NOTE — TELEPHONE ENCOUNTER
Requesting a refill of Valtrex.    As of this morning, she has a feeling of crawling on her back and feels like bee stings on her back. Said that her shingles are internal never had a full flare up with blisters. Or a visible rash on the skin. She said if she takes Valtrex for a couple days it goes away. Would you be able to send this medication to the pharmacy?

## 2025-05-05 ENCOUNTER — LAB (OUTPATIENT)
Dept: LAB | Facility: HOSPITAL | Age: 75
End: 2025-05-05
Payer: MEDICARE

## 2025-05-05 DIAGNOSIS — M81.0 AGE-RELATED OSTEOPOROSIS WITHOUT CURRENT PATHOLOGICAL FRACTURE: ICD-10-CM

## 2025-05-05 LAB
ALBUMIN SERPL BCP-MCNC: 4.2 G/DL (ref 3.4–5)
ALP SERPL-CCNC: 38 U/L (ref 33–136)
ALT SERPL W P-5'-P-CCNC: 11 U/L (ref 7–45)
ANION GAP SERPL CALC-SCNC: 10 MMOL/L (ref 10–20)
AST SERPL W P-5'-P-CCNC: 17 U/L (ref 9–39)
BILIRUB SERPL-MCNC: 0.4 MG/DL (ref 0–1.2)
BUN SERPL-MCNC: 13 MG/DL (ref 6–23)
CA-I BLD-SCNC: 1.21 MMOL/L (ref 1.1–1.33)
CALCIUM SERPL-MCNC: 9.4 MG/DL (ref 8.6–10.3)
CHLORIDE SERPL-SCNC: 103 MMOL/L (ref 98–107)
CO2 SERPL-SCNC: 30 MMOL/L (ref 21–32)
CREAT SERPL-MCNC: 0.88 MG/DL (ref 0.5–1.05)
EGFRCR SERPLBLD CKD-EPI 2021: 69 ML/MIN/1.73M*2
GLUCOSE SERPL-MCNC: 103 MG/DL (ref 74–99)
POTASSIUM SERPL-SCNC: 4.2 MMOL/L (ref 3.5–5.3)
PROT SERPL-MCNC: 6.5 G/DL (ref 6.4–8.2)
SODIUM SERPL-SCNC: 139 MMOL/L (ref 136–145)

## 2025-05-05 PROCEDURE — 82330 ASSAY OF CALCIUM: CPT

## 2025-05-05 PROCEDURE — 80053 COMPREHEN METABOLIC PANEL: CPT

## 2025-05-06 ENCOUNTER — INFUSION (OUTPATIENT)
Dept: HEMATOLOGY/ONCOLOGY | Facility: HOSPITAL | Age: 75
End: 2025-05-06
Payer: MEDICARE

## 2025-05-06 VITALS
HEART RATE: 60 BPM | TEMPERATURE: 97.5 F | SYSTOLIC BLOOD PRESSURE: 116 MMHG | BODY MASS INDEX: 18.3 KG/M2 | DIASTOLIC BLOOD PRESSURE: 69 MMHG | WEIGHT: 99.43 LBS | RESPIRATION RATE: 16 BRPM | OXYGEN SATURATION: 98 % | HEIGHT: 62 IN

## 2025-05-06 DIAGNOSIS — M81.0 AGE-RELATED OSTEOPOROSIS WITHOUT CURRENT PATHOLOGICAL FRACTURE: ICD-10-CM

## 2025-05-06 PROCEDURE — 96372 THER/PROPH/DIAG INJ SC/IM: CPT

## 2025-05-06 PROCEDURE — 2500000004 HC RX 250 GENERAL PHARMACY W/ HCPCS (ALT 636 FOR OP/ED): Mod: JZ,TB | Performed by: PHYSICIAN ASSISTANT

## 2025-05-06 RX ORDER — ALBUTEROL SULFATE 0.83 MG/ML
3 SOLUTION RESPIRATORY (INHALATION) AS NEEDED
OUTPATIENT
Start: 2025-11-01

## 2025-05-06 RX ORDER — EPINEPHRINE 0.3 MG/.3ML
0.3 INJECTION SUBCUTANEOUS EVERY 5 MIN PRN
OUTPATIENT
Start: 2025-11-01

## 2025-05-06 RX ORDER — DIPHENHYDRAMINE HYDROCHLORIDE 50 MG/ML
50 INJECTION, SOLUTION INTRAMUSCULAR; INTRAVENOUS AS NEEDED
OUTPATIENT
Start: 2025-11-01

## 2025-05-06 RX ORDER — FAMOTIDINE 10 MG/ML
20 INJECTION, SOLUTION INTRAVENOUS ONCE AS NEEDED
OUTPATIENT
Start: 2025-11-01

## 2025-05-06 RX ADMIN — DENOSUMAB 60 MG: 60 INJECTION SUBCUTANEOUS at 12:17

## 2025-05-06 ASSESSMENT — ENCOUNTER SYMPTOMS
LOSS OF SENSATION IN FEET: 0
OCCASIONAL FEELINGS OF UNSTEADINESS: 0
DEPRESSION: 0

## 2025-05-06 ASSESSMENT — PATIENT HEALTH QUESTIONNAIRE - PHQ9
2. FEELING DOWN, DEPRESSED OR HOPELESS: NOT AT ALL
SUM OF ALL RESPONSES TO PHQ9 QUESTIONS 1 & 2: 0
1. LITTLE INTEREST OR PLEASURE IN DOING THINGS: NOT AT ALL

## 2025-05-06 ASSESSMENT — COLUMBIA-SUICIDE SEVERITY RATING SCALE - C-SSRS
2. HAVE YOU ACTUALLY HAD ANY THOUGHTS OF KILLING YOURSELF?: NO
1. IN THE PAST MONTH, HAVE YOU WISHED YOU WERE DEAD OR WISHED YOU COULD GO TO SLEEP AND NOT WAKE UP?: NO
6. HAVE YOU EVER DONE ANYTHING, STARTED TO DO ANYTHING, OR PREPARED TO DO ANYTHING TO END YOUR LIFE?: NO

## 2025-05-06 ASSESSMENT — LIFESTYLE VARIABLES
HOW MANY STANDARD DRINKS CONTAINING ALCOHOL DO YOU HAVE ON A TYPICAL DAY: PATIENT DOES NOT DRINK
HOW OFTEN DO YOU HAVE SIX OR MORE DRINKS ON ONE OCCASION: NEVER
SKIP TO QUESTIONS 9-10: 1
HOW OFTEN DO YOU HAVE A DRINK CONTAINING ALCOHOL: NEVER
AUDIT-C TOTAL SCORE: 0

## 2025-05-06 ASSESSMENT — PAIN SCALES - GENERAL: PAINLEVEL_OUTOF10: 0-NO PAIN

## 2025-07-02 ENCOUNTER — APPOINTMENT (OUTPATIENT)
Dept: PRIMARY CARE | Facility: CLINIC | Age: 75
End: 2025-07-02
Payer: MEDICARE

## 2025-07-02 VITALS
BODY MASS INDEX: 18.37 KG/M2 | HEIGHT: 62 IN | WEIGHT: 99.8 LBS | OXYGEN SATURATION: 98 % | HEART RATE: 61 BPM | DIASTOLIC BLOOD PRESSURE: 64 MMHG | SYSTOLIC BLOOD PRESSURE: 120 MMHG | TEMPERATURE: 97.5 F

## 2025-07-02 DIAGNOSIS — F41.9 ANXIETY: ICD-10-CM

## 2025-07-02 DIAGNOSIS — E46 PROTEIN-CALORIE MALNUTRITION, UNSPECIFIED SEVERITY (MULTI): ICD-10-CM

## 2025-07-02 DIAGNOSIS — Z79.899 CONTROLLED SUBSTANCE AGREEMENT SIGNED: ICD-10-CM

## 2025-07-02 DIAGNOSIS — Z78.0 ASYMPTOMATIC POSTMENOPAUSAL STATE: ICD-10-CM

## 2025-07-02 DIAGNOSIS — Z00.00 MEDICARE ANNUAL WELLNESS VISIT, SUBSEQUENT: Primary | ICD-10-CM

## 2025-07-02 PROCEDURE — 1036F TOBACCO NON-USER: CPT | Performed by: PHYSICIAN ASSISTANT

## 2025-07-02 PROCEDURE — 1160F RVW MEDS BY RX/DR IN RCRD: CPT | Performed by: PHYSICIAN ASSISTANT

## 2025-07-02 PROCEDURE — 1170F FXNL STATUS ASSESSED: CPT | Performed by: PHYSICIAN ASSISTANT

## 2025-07-02 PROCEDURE — G0439 PPPS, SUBSEQ VISIT: HCPCS | Performed by: PHYSICIAN ASSISTANT

## 2025-07-02 PROCEDURE — 1159F MED LIST DOCD IN RCRD: CPT | Performed by: PHYSICIAN ASSISTANT

## 2025-07-02 RX ORDER — ALPRAZOLAM 0.25 MG/1
0.25 TABLET ORAL NIGHTLY PRN
Qty: 10 TABLET | Refills: 2 | Status: SHIPPED | OUTPATIENT
Start: 2025-07-02 | End: 2025-07-02

## 2025-07-02 RX ORDER — ALPRAZOLAM 0.25 MG/1
0.25 TABLET ORAL NIGHTLY PRN
Qty: 15 TABLET | Refills: 2 | Status: SHIPPED | OUTPATIENT
Start: 2025-07-02

## 2025-07-02 ASSESSMENT — ENCOUNTER SYMPTOMS
DEPRESSION: 0
LOSS OF SENSATION IN FEET: 0
OCCASIONAL FEELINGS OF UNSTEADINESS: 0

## 2025-07-02 ASSESSMENT — ACTIVITIES OF DAILY LIVING (ADL)
DRESSING: INDEPENDENT
GROCERY_SHOPPING: INDEPENDENT
MANAGING_FINANCES: INDEPENDENT
TAKING_MEDICATION: INDEPENDENT
BATHING: INDEPENDENT
DOING_HOUSEWORK: INDEPENDENT

## 2025-07-02 ASSESSMENT — ANXIETY QUESTIONNAIRES
IF YOU CHECKED OFF ANY PROBLEMS ON THIS QUESTIONNAIRE, HOW DIFFICULT HAVE THESE PROBLEMS MADE IT FOR YOU TO DO YOUR WORK, TAKE CARE OF THINGS AT HOME, OR GET ALONG WITH OTHER PEOPLE: SOMEWHAT DIFFICULT
6. BECOMING EASILY ANNOYED OR IRRITABLE: SEVERAL DAYS
3. WORRYING TOO MUCH ABOUT DIFFERENT THINGS: MORE THAN HALF THE DAYS
2. NOT BEING ABLE TO STOP OR CONTROL WORRYING: SEVERAL DAYS
1. FEELING NERVOUS, ANXIOUS, OR ON EDGE: SEVERAL DAYS
7. FEELING AFRAID AS IF SOMETHING AWFUL MIGHT HAPPEN: NOT AT ALL
5. BEING SO RESTLESS THAT IT IS HARD TO SIT STILL: SEVERAL DAYS
4. TROUBLE RELAXING: SEVERAL DAYS
GAD7 TOTAL SCORE: 7

## 2025-07-02 NOTE — PROGRESS NOTES
Subjective   HPI   Ciera Deluna is a 75 y.o. year old female patient with presenting to clinic with concern for Medicare Visit     Chief Complaint   Patient presents with    Medicare Annual Wellness Visit Subsequent     Moving/selling home. Has been stressed.  Had covid in March while visiting [place].    BP Readings from Last 5 Encounters:   07/02/25 120/64   05/06/25 116/69   01/02/25 106/62   11/07/24 104/54   10/22/24 112/61        Lab Results   Component Value Date    LDLCALC 93 12/18/2024     Anxiety  -zoloft 25   .  Xanax 0.25, using 15 q 30 days office visits q6mo.   Xanax      Recurrent shingles  - Valtrex  suppressive therapy     Insomnia  - Trazodone 50mg  taking half PRN 25mg     Osteoporosis  -Prolia  -calcium and vitamin D  DEXA due, ordered     Protein calorie malnutrition  -Increase protein intake in diet.   Recommended Boost or Ensure in addition to dietary changes    OARRS:  Miranda Sterling PA-C on 7/2/2025 10:46 AM  I have personally reviewed the OARRS report for Ciera Deluna. I have considered the risks of abuse, dependence, addiction and diversion    Is the patient prescribed a combination of a benzodiazepine and opioid?  No    Last Urine Drug Screen / ordered today: Yes  No results found for this or any previous visit (from the past 8760 hours).  Results are as expected.     Clinical rationale for not completing a Urine Drug Screen: pending result      Controlled Substance Agreement:  Date of the Last Agreement: 10/2/24  Reviewed Controlled Substance Agreement including but not limited to the benefits, risks, and alternatives to treatment with a Controlled Substance medication(s).    Benzodiazepines:  What is the patient's goal of therapy? Break through anxiety and panic attacks  Is this being achieved with current treatment? yes    NEPTALI-7:  Over the last 2 weeks, how often have you been bothered by any of the following problems?  Feeling nervous, anxious, or on edge: 1  Not  being able to stop or control worryin  Worrying too much about different things: 2  Trouble relaxin  Being so restless that it is hard to sit still: 1  Becoming easily annoyed or irritable: 1  Feeling afraid as if something awful might happen: 0  NEPTALI-7 Total Score: 7        Activities of Daily Living:   Is your overall impression that this patient is benefiting (symptom reduction outweighs side effects) from benzodiazepine therapy? Yes     1. Physical Functioning: Better  2. Family Relationship: Better  3. Social Relationship: Same  4. Mood: Better  5. Sleep Patterns: Better  6. Overall Function: Better    Patient Care Team:  Miranda Sterling PA-C as PCP - General (Family Medicine)     Specialists    Past Medical, Surgical, and Family History reviewed and updated in chart.    Reviewed all medications by prescribing practitioner or clinical pharmacist (such as prescriptions, OTCs, herbal therapies and supplements) and documented in the medical record.     Preventative Health   Health Maintenance Due   Topic Date Due    Hepatitis C Screening  Never done    Pneumococcal Vaccination (2 of 2 - PCV) 2017    DTaP/Tdap/Td Vaccines (2 - Td or Tdap) 2024    Bone density scan  2025            Topic Date Due    DTaP/Tdap/Td Vaccines (2 - Td or Tdap) 2024        Immunizations Reviewed  Immunization History   Administered Date(s) Administered    Flu vaccine, trivalent, preservative free, HIGH-DOSE, age 65y+ (Fluzone) 2019    Pfizer Purple Cap SARS-CoV-2 2021, 2021    Pneumococcal polysaccharide vaccine, 23-valent, age 2 years and older (PNEUMOVAX 23) 2016    Tdap vaccine, age 7 year and older (BOOSTRIX, ADACEL) 2014        Problem List Reviewed  Problem List[1]    Medical History[2]   Surgical History[3]   Family History[4]   Social History     Tobacco Use    Smoking status: Never    Smokeless tobacco: Never   Substance Use Topics    Alcohol use: Yes     Comment:  "social        Medications Reviewed  Medications Ordered Prior to Encounter[5]     Review of Systems  Constitutional: Denies fever  HEENT: Denies ST, earache  CVS: Denies Chest pain  Pulmonary: Denies wheezing, SOB  GI: Denies N/V  : Denies dysuria  Musculoskeletal:  Denies myalgia  Neuro: Denies focal weakness or numbness.  Skin: Denies Rashes.  *Review of Systems is negative unless otherwise mentioned in HPI or ROS above.      @OBJECTIVEBEGIN  /64   Pulse 61   Temp 36.4 °C (97.5 °F)   Ht 1.585 m (5' 2.4\")   Wt 45.3 kg (99 lb 12.8 oz)   SpO2 98%   BMI 18.02 kg/m²  reviewed Body mass index is 18.02 kg/m².     Physical Exam  Constitutional: NAD. Afebrile. Resting comfortably. Appears younger than stated age.  ENT: Nasal mucosa and oropharynx: moist oral mucosa. Posterior oropharynx nonerythematous. No posterior pharyngeal streaking.  Eyes: PERRLA. EOM intact. No vertical or circular nystagmus.  Lymph: No anterior cervical chain or submandibular lymphadenopathy. No sentinel lymph nodes.  Cardiac: Regular rate & rhythm. No murmur, gallops, or rubs.  Pulmonary: Lungs clear to auscultation bilaterally with good aeration. No wheezes, rhonchi, or rales. Normal WOB.  GI: Soft, Nontender, nondistended. No guarding. Normal BS x4.  : No suprapubic tenderness. No CVA tenderness to percussion.   Musculoskeletal: No peripheral edema.   Skin: No evidence of trauma. No rashes  Neuro: No focal neuro deficits. Normal gait without assistive devices.  Psych: Intact judgement and insight.    MDM        .Assessment/Plan   Problem List Items Addressed This Visit           ICD-10-CM    Anxiety F41.9    Relevant Medications    ALPRAZolam (Xanax) 0.25 mg tablet    Protein-calorie malnutrition, unspecified severity (Multi) E46     Other Visit Diagnoses         Codes      Medicare annual wellness visit, subsequent    -  Primary Z00.00      Controlled substance agreement signed     Z79.899    Relevant Orders    " Opiate/Opioid/Benzo Prescription Compliance      Asymptomatic postmenopausal state     Z78.0    Relevant Orders    XR DEXA bone density                 [1]   Patient Active Problem List  Diagnosis    Age-related osteoporosis without current pathological fracture    Allergic rhinitis due to allergen    Anxiety    Bursitis of hip    Chronic constipation    Chronic fatigue    Deviated nasal septum    Dyslipidemia    GERD (gastroesophageal reflux disease)    Heart palpitations    Squamous cell carcinoma in situ    Carcinoma in situ of skin of other parts of face    Disease of skin and subcutaneous tissue    Dysphagia    Hypertrophy of nasal turbinates    Localized swelling, mass and lump, left upper limb    Other specified disorders of bone density and structure, unspecified site    DJD (degenerative joint disease)    Flying phobia    Leucopenia    Osteopenia of multiple sites    Osteoporosis    Personal history of colonic polyps    Localized primary osteoarthritis of wrist    Primary arthrosis of left distal radioulnar joint    Psychophysiological insomnia    Tachycardia    Underweight    Vitamin D deficiency    Protein-calorie malnutrition, unspecified severity (Multi)   [2]   Past Medical History:  Diagnosis Date    Acute maxillary sinusitis, unspecified 2022    Acute non-recurrent maxillary sinusitis    Anxiety     Otitis media, unspecified, bilateral 2022    Acute bilateral otitis media    Personal history of other diseases of the respiratory system 2022    History of acute bronchitis   [3]   Past Surgical History:  Procedure Laterality Date    OTHER SURGICAL HISTORY  2021    Cataract surgery    OTHER SURGICAL HISTORY  2021    Tonsillectomy    OTHER SURGICAL HISTORY  2021    Eye surgery    OTHER SURGICAL HISTORY  2021     section   [4]   Family History  Problem Relation Name Age of Onset    Pancreatic cancer Mother      Other (Bladder Cancer) Father     [5]    Current Outpatient Medications on File Prior to Visit   Medication Sig Dispense Refill    NON FORMULARY Take by mouth 1 (one) time per week. Calcium acetate-magnesium carbonate 450mg-200mg oral tablet      sertraline (Zoloft) 25 mg tablet Take 1 tablet (25 mg) by mouth once daily. 90 tablet 1    traZODone (Desyrel) 50 mg tablet Take 1 tablet (50 mg) by mouth as needed at bedtime for sleep. 90 tablet 0    valACYclovir (Valtrex) 500 mg tablet Take 1 tablet (500 mg) by mouth once daily. 90 tablet 3    [DISCONTINUED] ALPRAZolam (Xanax) 0.25 mg tablet Take 1 tablet (0.25 mg) by mouth as needed at bedtime for anxiety. 15 tablet 2     No current facility-administered medications on file prior to visit.

## 2025-07-05 LAB
1OH-MIDAZOLAM UR-MCNC: NEGATIVE NG/ML
7AMINOCLONAZEPAM UR-MCNC: NEGATIVE NG/ML
A-OH ALPRAZ UR-MCNC: NEGATIVE NG/ML
A-OH-TRIAZOLAM UR-MCNC: NEGATIVE NG/ML
AMPHETAMINES UR QL: NEGATIVE NG/ML
BARBITURATES UR QL: NEGATIVE NG/ML
BZE UR QL: NEGATIVE NG/ML
CODEINE UR-MCNC: NEGATIVE NG/ML
CREAT UR-MCNC: 31.4 MG/DL
DRUG SCREEN COMMENT UR-IMP: NORMAL
EDDP UR-MCNC: NEGATIVE NG/ML
FENTANYL UR-MCNC: NORMAL NG/ML
HYDROCODONE UR-MCNC: NEGATIVE NG/ML
HYDROMORPHONE UR-MCNC: NEGATIVE NG/ML
LORAZEPAM UR-MCNC: NEGATIVE NG/ML
METHADONE UR-MCNC: NEGATIVE NG/ML
MORPHINE UR-MCNC: NEGATIVE NG/ML
NORDIAZEPAM UR-MCNC: NEGATIVE NG/ML
NORFENTANYL UR-MCNC: NORMAL NG/ML
NORHYDROCODONE UR CFM-MCNC: NEGATIVE NG/ML
NOROXYCODONE UR CFM-MCNC: NEGATIVE NG/ML
NORTRAMADOL UR-MCNC: NORMAL UG/ML
OH-ETHYLFLURAZ UR-MCNC: NEGATIVE NG/ML
OXAZEPAM UR-MCNC: NEGATIVE NG/ML
OXIDANTS UR QL: NEGATIVE MCG/ML
OXYCODONE UR CFM-MCNC: NEGATIVE NG/ML
OXYMORPHONE UR CFM-MCNC: NEGATIVE NG/ML
PCP UR QL: NEGATIVE NG/ML
PH UR: 7.5 [PH] (ref 4.5–9)
QUEST 6 ACETYLMORPHINE: NORMAL
QUEST NOTES AND COMMENTS: NORMAL
QUEST PATIENT HISTORICAL REPORT: NORMAL
QUEST ZOLPIDEM: NORMAL
TEMAZEPAM UR-MCNC: NEGATIVE NG/ML
THC UR QL: NEGATIVE NG/ML
TRAMADOL UR-MCNC: NORMAL UG/ML
ZOLPIDEM PHENYL-4-CARB UR CFM-MCNC: NORMAL NG/ML

## 2025-07-08 LAB
1OH-MIDAZOLAM UR-MCNC: NEGATIVE NG/ML
7AMINOCLONAZEPAM UR-MCNC: NEGATIVE NG/ML
A-OH ALPRAZ UR-MCNC: NEGATIVE NG/ML
A-OH-TRIAZOLAM UR-MCNC: NEGATIVE NG/ML
AMPHETAMINES UR QL: NEGATIVE NG/ML
BARBITURATES UR QL: NEGATIVE NG/ML
BZE UR QL: NEGATIVE NG/ML
CODEINE UR-MCNC: NEGATIVE NG/ML
CREAT UR-MCNC: 31.4 MG/DL
DRUG SCREEN COMMENT UR-IMP: NORMAL
EDDP UR-MCNC: NEGATIVE NG/ML
FENTANYL UR-MCNC: NEGATIVE NG/ML
HYDROCODONE UR-MCNC: NEGATIVE NG/ML
HYDROMORPHONE UR-MCNC: NEGATIVE NG/ML
LORAZEPAM UR-MCNC: NEGATIVE NG/ML
METHADONE UR-MCNC: NEGATIVE NG/ML
MORPHINE UR-MCNC: NEGATIVE NG/ML
NORDIAZEPAM UR-MCNC: NEGATIVE NG/ML
NORFENTANYL UR-MCNC: NEGATIVE NG/ML
NORHYDROCODONE UR CFM-MCNC: NEGATIVE NG/ML
NOROXYCODONE UR CFM-MCNC: NEGATIVE NG/ML
NORTRAMADOL UR-MCNC: NEGATIVE NG/ML
OH-ETHYLFLURAZ UR-MCNC: NEGATIVE NG/ML
OXAZEPAM UR-MCNC: NEGATIVE NG/ML
OXIDANTS UR QL: NEGATIVE MCG/ML
OXYCODONE UR CFM-MCNC: NEGATIVE NG/ML
OXYMORPHONE UR CFM-MCNC: NEGATIVE NG/ML
PCP UR QL: NEGATIVE NG/ML
PH UR: 7.5 [PH] (ref 4.5–9)
QUEST 6 ACETYLMORPHINE: NEGATIVE NG/ML
QUEST NOTES AND COMMENTS: NORMAL
QUEST ZOLPIDEM: NEGATIVE NG/ML
TEMAZEPAM UR-MCNC: NEGATIVE NG/ML
THC UR QL: NEGATIVE NG/ML
TRAMADOL UR-MCNC: NEGATIVE NG/ML
ZOLPIDEM PHENYL-4-CARB UR CFM-MCNC: NEGATIVE NG/ML

## 2025-07-10 DIAGNOSIS — F41.9 ANXIETY: ICD-10-CM

## 2025-07-10 RX ORDER — SERTRALINE HYDROCHLORIDE 25 MG/1
25 TABLET, FILM COATED ORAL DAILY
Qty: 90 TABLET | Refills: 3 | Status: SHIPPED | OUTPATIENT
Start: 2025-07-10 | End: 2026-01-06

## 2025-07-14 ENCOUNTER — HOSPITAL ENCOUNTER (OUTPATIENT)
Dept: RADIOLOGY | Facility: HOSPITAL | Age: 75
Discharge: HOME | End: 2025-07-14
Payer: MEDICARE

## 2025-07-14 DIAGNOSIS — Z78.0 ASYMPTOMATIC POSTMENOPAUSAL STATE: ICD-10-CM

## 2025-07-14 PROCEDURE — 77080 DXA BONE DENSITY AXIAL: CPT

## 2025-07-14 PROCEDURE — 77080 DXA BONE DENSITY AXIAL: CPT | Performed by: RADIOLOGY

## 2026-01-06 ENCOUNTER — APPOINTMENT (OUTPATIENT)
Dept: PRIMARY CARE | Facility: CLINIC | Age: 76
End: 2026-01-06
Payer: MEDICARE